# Patient Record
Sex: FEMALE | Race: WHITE | Employment: FULL TIME | URBAN - METROPOLITAN AREA
[De-identification: names, ages, dates, MRNs, and addresses within clinical notes are randomized per-mention and may not be internally consistent; named-entity substitution may affect disease eponyms.]

---

## 2018-04-06 DIAGNOSIS — G40.909 SEIZURE DISORDER (HCC): ICD-10-CM

## 2018-04-06 RX ORDER — PHENYTOIN SODIUM 100 MG/1
CAPSULE, EXTENDED RELEASE ORAL
Qty: 270 CAPSULE | Refills: 3 | Status: SHIPPED | OUTPATIENT
Start: 2018-04-06 | End: 2019-06-28 | Stop reason: SDUPTHER

## 2018-07-19 ENCOUNTER — OFFICE VISIT (OUTPATIENT)
Dept: NEUROLOGY | Age: 48
End: 2018-07-19
Payer: COMMERCIAL

## 2018-07-19 VITALS
HEIGHT: 65 IN | BODY MASS INDEX: 23.88 KG/M2 | SYSTOLIC BLOOD PRESSURE: 103 MMHG | WEIGHT: 143.3 LBS | DIASTOLIC BLOOD PRESSURE: 67 MMHG | HEART RATE: 90 BPM | OXYGEN SATURATION: 95 %

## 2018-07-19 DIAGNOSIS — G40.909 SEIZURE DISORDER (HCC): Primary | ICD-10-CM

## 2018-07-19 PROCEDURE — 99214 OFFICE O/P EST MOD 30 MIN: CPT | Performed by: CLINICAL NURSE SPECIALIST

## 2018-07-19 RX ORDER — ESTRADIOL 0.1 MG/D
FILM, EXTENDED RELEASE TRANSDERMAL
Refills: 0 | COMMUNITY
Start: 2018-07-12

## 2018-07-19 NOTE — PROGRESS NOTES
no carotid bruit, no JVD, supple  Lungs: clear to auscultation bilaterally  Heart: regular rate and rhythm, S1, S2 normal, no murmur, click, rub or gallop  Extremities:no cyanosis or edema  Pulses: 2+ and symmetric  Skin:  No rashes or lesions     Mental Status: alert and oriented to person place and time    Speech: clear  Language: normal    Cranial Nerves:  I: smell    II: visual acuity     II: visual fields Full   II: pupils MARIBEL   III,VII: ptosis    III,IV,VI: extraocular muscles  Full ROM   V: mastication Normal   V: facial light touch sensation  Normal   V,VII: corneal reflex  Present   VII: facial muscle function - upper     VII: facial muscle function - lower Normal   VIII: hearing Normal   IX: soft palate elevation  Normal   IX,X: gag reflex Present   XI: trapezius strength  5/5   XI: sternocleidomastoid strength 5/5   XI: neck extension strength  5/5   XII: tongue strength  Normal       Motor:  5/5 throughout  Normal bulk and tone    Sensory:  LT and PP normal  Vibration normal    Coordination:   FN, FFM and LALO normal  HS normal    Gait:  normal    DTR:   Right Brachioradialis reflex 2+  Left Brachioradialis reflex 2+  Right Biceps reflex 2+  Left Biceps reflex 2+  Right Quadriceps reflex 2+  Left Quadriceps reflex 2+  Right Achilles reflex 2+  Left Achilles reflex 2+      Laboratory/Radiology:     performed by PCP at [facility] over a year ago     I do not have these to personally review at this time     Assessment:     Simple partial seizures with secondary generalization   Previously table on Dilantin D.A.W. 300mg daily    Now switching to generic due to costs     No seizures reported    Plan:     Continue AED for now   Discussed at length about the possibility of switching if she is unable to tolerate generic formulation     Will obtain Dilantin level and CMP as well as CBC    RTO 1 year    Call with new difficulties    Roland Agrawal  3:26 PM  7/19/2018

## 2018-08-29 DIAGNOSIS — G40.909 SEIZURE DISORDER (HCC): ICD-10-CM

## 2018-08-29 LAB
ALBUMIN SERPL-MCNC: NORMAL G/DL
ALP BLD-CCNC: NORMAL U/L
ALT SERPL-CCNC: NORMAL U/L
ANION GAP SERPL CALCULATED.3IONS-SCNC: NORMAL MMOL/L
AST SERPL-CCNC: NORMAL U/L
BASOPHILS ABSOLUTE: NORMAL /ΜL
BASOPHILS RELATIVE PERCENT: NORMAL %
BILIRUB SERPL-MCNC: NORMAL MG/DL (ref 0.1–1.4)
BUN BLDV-MCNC: 15 MG/DL
CALCIUM SERPL-MCNC: NORMAL MG/DL
CHLORIDE BLD-SCNC: 104 MMOL/L
CO2: NORMAL MMOL/L
CREAT SERPL-MCNC: NORMAL MG/DL
EOSINOPHILS ABSOLUTE: NORMAL /ΜL
EOSINOPHILS RELATIVE PERCENT: NORMAL %
GFR CALCULATED: NORMAL
GLUCOSE BLD-MCNC: NORMAL MG/DL
HCT VFR BLD CALC: 41.4 % (ref 36–46)
HEMOGLOBIN: 14.1 G/DL (ref 12–16)
LYMPHOCYTES ABSOLUTE: NORMAL /ΜL
LYMPHOCYTES RELATIVE PERCENT: NORMAL %
MCH RBC QN AUTO: NORMAL PG
MCHC RBC AUTO-ENTMCNC: NORMAL G/DL
MCV RBC AUTO: NORMAL FL
MONOCYTES ABSOLUTE: NORMAL /ΜL
MONOCYTES RELATIVE PERCENT: NORMAL %
NEUTROPHILS ABSOLUTE: NORMAL /ΜL
NEUTROPHILS RELATIVE PERCENT: NORMAL %
PDW BLD-RTO: NORMAL %
PLATELET # BLD: NORMAL K/ΜL
PMV BLD AUTO: NORMAL FL
POTASSIUM SERPL-SCNC: 4.1 MMOL/L
RBC # BLD: 4.3 10^6/ΜL
SODIUM BLD-SCNC: 136 MMOL/L
TOTAL PROTEIN: NORMAL
WBC # BLD: 4.4 10^3/ML

## 2019-03-25 ENCOUNTER — HOSPITAL ENCOUNTER (OUTPATIENT)
Dept: GENERAL RADIOLOGY | Age: 49
Discharge: HOME | End: 2019-03-27
Payer: COMMERCIAL

## 2019-03-25 DIAGNOSIS — Z12.31 ENCOUNTER FOR SCREENING MAMMOGRAM FOR MALIGNANT NEOPLASM OF BREAST: ICD-10-CM

## 2019-03-25 PROCEDURE — 77063 BREAST TOMOSYNTHESIS BI: CPT

## 2019-06-27 DIAGNOSIS — G40.909 SEIZURE DISORDER (HCC): ICD-10-CM

## 2019-06-28 RX ORDER — PHENYTOIN SODIUM 100 MG/1
CAPSULE, EXTENDED RELEASE ORAL
Qty: 270 CAPSULE | Refills: 3 | Status: SHIPPED
Start: 2019-06-28 | End: 2020-06-16

## 2019-07-30 ENCOUNTER — OFFICE VISIT (OUTPATIENT)
Dept: NEUROLOGY | Age: 49
End: 2019-07-30
Payer: COMMERCIAL

## 2019-07-30 VITALS
WEIGHT: 140 LBS | BODY MASS INDEX: 23.32 KG/M2 | RESPIRATION RATE: 14 BRPM | HEIGHT: 65 IN | SYSTOLIC BLOOD PRESSURE: 111 MMHG | OXYGEN SATURATION: 97 % | DIASTOLIC BLOOD PRESSURE: 74 MMHG | HEART RATE: 81 BPM

## 2019-07-30 DIAGNOSIS — G40.909 SEIZURE DISORDER (HCC): Primary | ICD-10-CM

## 2019-07-30 PROCEDURE — 99214 OFFICE O/P EST MOD 30 MIN: CPT | Performed by: CLINICAL NURSE SPECIALIST

## 2019-07-30 NOTE — PROGRESS NOTES
Krystin Wei is a 57 y.o. right handed female     Long history of epilepsy   Tried numerous AEDs in the past   Unable to tolerate Keppra (marked alopecia)    Tried generic phenytoin without success in past -- increased auras    Has been seizure free for many years - maintained on Dilantin CARMINA -- now becoming quite expensive     She requested to retry generic and this was recently ordered   Hesitant to try alternatives because of issues with Keppra     No auras reported in years   No seizures reported in years     Not missing any doses of medications     No tongue biting   No incontinence  No seizure activity     Medically, she denies any difficulties - no new medications     Labs reviewed since last appt     No chest pain or palpitations  No SOB  No vertigo, lightheadedness or loss of consciousness  No falls, tripping or stumbling  No incontinence of bowels or bladder  No itching or bruising appreciated  No numbness, tingling or focal arm/leg weakness    ROS otherwise negative     Prior to Visit Medications    Medication Sig Taking?  Authorizing Provider   phenytoin (DILANTIN) 100 MG ER capsule Take 3 capsules daily - CARMINA only Yes PK Ho - CNS   estradiol (VIVELLE) 0.1 MG/24HR UNW AND MIGUEL 1 PA TO THE SKIN  TWICE A WEEK Yes Historical Provider, MD   PREMARIN 0.9 MG tablet Take 0.9 mg by mouth daily  Yes Historical Provider, MD   venlafaxine (EFFEXOR-XR) 75 MG XR capsule Take 75 mg by mouth daily  Yes Historical Provider, MD   RESTASIS 0.05 % EMUL  Yes Historical Provider, MD     Allergies as of 07/30/2019    (No Known Allergies)     Objective:     /74 (Site: Left Upper Arm, Position: Sitting, Cuff Size: Medium Adult)   Pulse 81   Resp 14   Ht 5' 5\" (1.651 m)   Wt 140 lb (63.5 kg)   SpO2 97%   BMI 23.30 kg/m²   Afebrile      General appearance: alert, appears stated age and cooperative  Head: Normocephalic, without obvious abnormality, atraumatic  Neck: no adenopathy, no carotid bruit,

## 2020-02-20 ENCOUNTER — TELEPHONE (OUTPATIENT)
Dept: NEUROLOGY | Age: 50
End: 2020-02-20

## 2020-03-02 ENCOUNTER — OFFICE VISIT (OUTPATIENT)
Dept: PRIMARY CARE CLINIC | Age: 50
End: 2020-03-02
Payer: COMMERCIAL

## 2020-03-02 ENCOUNTER — NURSE TRIAGE (OUTPATIENT)
Dept: OTHER | Facility: CLINIC | Age: 50
End: 2020-03-02

## 2020-03-02 VITALS
BODY MASS INDEX: 23.46 KG/M2 | RESPIRATION RATE: 14 BRPM | TEMPERATURE: 98.5 F | WEIGHT: 141 LBS | OXYGEN SATURATION: 98 % | HEART RATE: 89 BPM | DIASTOLIC BLOOD PRESSURE: 78 MMHG | SYSTOLIC BLOOD PRESSURE: 104 MMHG

## 2020-03-02 PROCEDURE — 99214 OFFICE O/P EST MOD 30 MIN: CPT | Performed by: FAMILY MEDICINE

## 2020-03-02 PROCEDURE — 93000 ELECTROCARDIOGRAM COMPLETE: CPT | Performed by: FAMILY MEDICINE

## 2020-03-02 ASSESSMENT — PATIENT HEALTH QUESTIONNAIRE - PHQ9
SUM OF ALL RESPONSES TO PHQ QUESTIONS 1-9: 0
2. FEELING DOWN, DEPRESSED OR HOPELESS: 0
SUM OF ALL RESPONSES TO PHQ9 QUESTIONS 1 & 2: 0
1. LITTLE INTEREST OR PLEASURE IN DOING THINGS: 0
SUM OF ALL RESPONSES TO PHQ QUESTIONS 1-9: 0

## 2020-03-02 NOTE — PROGRESS NOTES
3/2/20  Luly Lopez : 1970 Sex: female  Age: 54 y.o. Chief Complaint   Patient presents with    Palpitations     Pt states for the past 2-3 months she's had heart palpitations that have gotten progressively worse. No new meds, no dietary changes and drinks a soda containing caffeine and tea that contains caffeine everyday. HPI  HPI:    Patient presents today complaining of palpitations. She states she is noticed them periodically over the past 2 to 3 months but she always attributed to cough and cold meds etc.  Admits to 2 or 3 caffeinated beverages a day sometimes more but nothing is changed. Last night she noticed a couple hours of palpitations, eventually subsided on its own. There was no associated symptoms including chest pain pressure shortness of breath diaphoresis nausea syncope dizziness near syncope abdominal pain vomiting or otherwise. She is here with her  today. She denies any change in anxiety, denies nicotine or otherwise. No other complaints or concerns. Last seen . Called in today and came in as a \"same day\". Vital signs are stable. Risks of estradiol reviewed. Defers chest imaging at this time. Current ROS as below and asymptomatic today  Review of Systems  ROS:  Const: Denies chills, fever, malaise and sweats. Eyes: Denies discharge, pain, redness and visual disturbance. ENMT: Denies earaches, other ear symptoms. Denies nasal or sinus symptoms other than stated  above. Denies mouth and tongue lesions and sore throat. CV: Denies chest discomfort, pain; diaphoresis, dizziness, edema, lightheadedness, orthopnea,  palpitations, syncope and near syncopal episode or any exertional symptoms  Resp: Denies cough, hemoptysis, pleuritic pain, SOB, sputum production and wheezing. GI: Denies abdominal pain, change in bowel habits, hematochezia, melena, nausea and vomiting.   : Denies urinary symptoms including dysuria , urgency, frequency or ovarian resection secondary endometriosis    Reviewed, no changes. FH:    Father:    . (Hx)    Mother:    . (Hx)    Dad - doesn't know    Mom - living, HTN    Not close with brother. Maternal grandmother ? type of cancer age 66's, had CABG 52's or 63's    Maternal grandfather   MI age 42's        Paternal grandfather ?type of cancer 66's    Reviewed, no changes. SH:    . (Marital)     -     3 step kids    Personal Habits: Cigarette Use: Nonsmoker. Alcohol: Denies alcohol use. Vitals:    20 1051   BP: 104/78   Pulse: 89   Resp: 14   Temp: 98.5 °F (36.9 °C)   TempSrc: Temporal   SpO2: 98%   Weight: 141 lb (64 kg)      Wt Readings from Last 3 Encounters:   20 141 lb (64 kg)   19 140 lb (63.5 kg)   18 143 lb 4.8 oz (65 kg)        Physical Exam  Exam:  Const: Appears comfortable. No signs of acute distress present. Head/Face: Atraumatic on inspection. Eyes: EOMI in both eyes. No discharge from the eyes. PERRL. Sclerae clear. ENMT: Auditory canals normal. Tympanic membranes: intact and translucent. External nose WNL. Nasal mucosa is clear. Oropharynx: No erythema or exudate. Posterior pharynx is normal.  Neck: Supple. Palpation reveals no adenopathy. No masses appreciated. No JVD. Carotids: no  bruits. Resp: Respirations are unlabored. Clear to auscultation. No rales, rhonchi or wheezes appreciated  over the lungs bilaterally. CV: Rate is regular. Rhythm is regular. No gallop or rubs. No heart murmur appreciated. Extremities: No clubbing, cyanosis, or edema. No calf inflammation or tenderness. Abdomen: Bowel sounds are normoactive. Abdomen is soft, nontender, and nondistended. No  abdominal masses. No palpable hepatosplenomegaly. Lymph: No palpable or visible regional lymphadenopathy. Musculoskeletal: no acute joint inflammation. Skin: Dry and warm with no rash. Skin normal to inspection and palpation overall.   Neuro: Alert and oriented. Affect: appropriate. Upper Extremities: 5/5 bilaterally. Lower Extremities:  5/5 bilaterally. Sensation intact to light touch. Reflexes: DTR's are symmetric and 2+ bilaterally. .  Cranial Nerves: Cranial nerves grossly intact. Assessment and Plan:   Diagnosis Orders   1. Heart palpitations  EKG 12 Lead    CBC Auto Differential    Comprehensive Metabolic Panel    TSH without Reflex    T4, Free    Lipid Panel    Magnesium    Urinalysis    Ambulatory referral to Cardiology   2. Health maintenance examination  Lipid Panel    Urinalysis       No problem-specific Assessment & Plan notes found for this encounter. No flowsheet data found. Plan as above. Counseled extensively and differential diagnoses relevant to above were reviewed, including serious etiologies. Side effects and interactions of medications were reviewed. My concerns reviewed. She wants to simply look at this on a nonurgent outpatient basis. Refuses ER hospitalization now. Refer to cardiology ASAP. Discussed Holter/event monitor, stress test echo. Avoid stimulants. Check blood work. As long as remains asymptomatic follow-up 1 week sooner as needed. As long as symptoms steadily improve/resolve, and medical conditions follow the expected course, FU as below, sooner PRN. No follow-ups on file. Signs and symptoms to watch for discussed, serious signs and symptoms reviewed. ER if any. Grace Rhoades MD    Patients are advised to check with insurance company to ensure coverage and to fully understand benefits and cost prior to any testing. This note was created with the assistance of voice recognition software. Document was reviewed however may contain grammatical errors.

## 2020-03-03 LAB
BASOPHILS ABSOLUTE: 42 /ΜL
BASOPHILS RELATIVE PERCENT: 0.9 %
EOSINOPHILS ABSOLUTE: 240 /ΜL
EOSINOPHILS RELATIVE PERCENT: 5.1 %
HCT VFR BLD CALC: 41.1 % (ref 36–46)
HEMOGLOBIN: 14.4 G/DL (ref 12–16)
LYMPHOCYTES ABSOLUTE: 1368 /ΜL
LYMPHOCYTES RELATIVE PERCENT: 29.1 %
MCH RBC QN AUTO: 32.4 PG
MCHC RBC AUTO-ENTMCNC: 35 G/DL
MCV RBC AUTO: 92.4 FL
MONOCYTES ABSOLUTE: 362 /ΜL
MONOCYTES RELATIVE PERCENT: 7.7 %
NEUTROPHILS ABSOLUTE: 2688 /ΜL
NEUTROPHILS RELATIVE PERCENT: 57.2 %
PDW BLD-RTO: 11.9 %
PLATELET # BLD: 241 K/ΜL
PMV BLD AUTO: 9.8 FL
RBC # BLD: 4.45 10^6/ΜL
WBC # BLD: 4.7 10^3/ML

## 2020-03-04 LAB
ALBUMIN SERPL-MCNC: 4.5 G/DL
ALP BLD-CCNC: 100 U/L
ALT SERPL-CCNC: 11 U/L
ANION GAP SERPL CALCULATED.3IONS-SCNC: NORMAL MMOL/L
AST SERPL-CCNC: 15 U/L
BILIRUB SERPL-MCNC: 0.5 MG/DL (ref 0.1–1.4)
BILIRUBIN, URINE: NEGATIVE
BLOOD, URINE: NEGATIVE
BUN BLDV-MCNC: 10 MG/DL
CALCIUM SERPL-MCNC: 9 MG/DL
CHLORIDE BLD-SCNC: 102 MMOL/L
CHOLESTEROL, TOTAL: 202 MG/DL
CHOLESTEROL/HDL RATIO: 2.2
CLARITY: CLEAR
CO2: 31 MMOL/L
COLOR: YELLOW
CREAT SERPL-MCNC: 0.68 MG/DL
GFR CALCULATED: 104
GLUCOSE BLD-MCNC: 88 MG/DL
GLUCOSE URINE: NORMAL
HDLC SERPL-MCNC: 93 MG/DL (ref 35–70)
KETONES, URINE: NEGATIVE
LDL CHOLESTEROL CALCULATED: 88 MG/DL (ref 0–160)
LEUKOCYTE ESTERASE, URINE: NEGATIVE
MAGNESIUM: 1.9 MG/DL
NITRITE, URINE: NEGATIVE
PH UA: 6 (ref 4.5–8)
POTASSIUM SERPL-SCNC: 4 MMOL/L
PROTEIN UA: NEGATIVE
SODIUM BLD-SCNC: 139 MMOL/L
SPECIFIC GRAVITY, URINE: 1.01
T4 FREE: 0.9
TOTAL PROTEIN: 6.6
TRIGL SERPL-MCNC: 111 MG/DL
TSH SERPL DL<=0.05 MIU/L-ACNC: 2.58 UIU/ML
UROBILINOGEN, URINE: NORMAL
VLDLC SERPL CALC-MCNC: ABNORMAL MG/DL

## 2020-03-05 ENCOUNTER — OFFICE VISIT (OUTPATIENT)
Dept: CARDIOLOGY CLINIC | Age: 50
End: 2020-03-05
Payer: COMMERCIAL

## 2020-03-05 VITALS
HEART RATE: 67 BPM | RESPIRATION RATE: 16 BRPM | SYSTOLIC BLOOD PRESSURE: 118 MMHG | WEIGHT: 145 LBS | DIASTOLIC BLOOD PRESSURE: 84 MMHG | HEIGHT: 65 IN | BODY MASS INDEX: 24.16 KG/M2

## 2020-03-05 PROBLEM — R00.2 PALPITATIONS: Status: ACTIVE | Noted: 2020-03-05

## 2020-03-05 PROCEDURE — 93000 ELECTROCARDIOGRAM COMPLETE: CPT | Performed by: INTERNAL MEDICINE

## 2020-03-05 PROCEDURE — 99244 OFF/OP CNSLTJ NEW/EST MOD 40: CPT | Performed by: INTERNAL MEDICINE

## 2020-03-05 NOTE — PROGRESS NOTES
Fear of current or ex partner: Not on file     Emotionally abused: Not on file     Physically abused: Not on file     Forced sexual activity: Not on file   Other Topics Concern    Not on file   Social History Narrative    PMH:    Health Maintenance:    Mammogram - (2016)    Mammogram Screening - (2016)    Tetanus Immunization - (2016)    Medical Problems:    Seizure Disorder - since age 25. Dr Darnell Rush    GERD - Had EGD/C-scope by Dr Rober Ernst and Ph probe     IBS    endometriosis - surgery to remove ovaries 8/15. wrapped around colon as well        Surgical Hx:    [place] -     Partial Hysterectomy - both ovaries remain. [facility])    Breast Reduction    EGD - , 2012 showing gastritis/GERD and [facility]    PH probe 2012    Colonoscopy -  [facility]; rudundant colon    laparoscopic - ovarian resection secondary endometriosis    Reviewed, no changes. FH:    Father:    . (Hx)    Mother:    . (Hx)    Dad - doesn't know    Mom - living, HTN    Not close with brother. Maternal grandmother ? type of cancer age 66's, had CABG 52's or 63's    Maternal grandfather   MI age 42's        Paternal grandfather ?type of cancer 66's    Reviewed, no changes. SH:    . (Marital)     -     3 step kids    Personal Habits: Cigarette Use: Nonsmoker. Alcohol: Denies alcohol use. History reviewed. No pertinent family history. SUBJECTIVE: Luly Suárez presents to the office today for consult - dr. Ghassan Licea - for evaluation of palpitations. She complains of palpitations and denies   chest pain, chest pressure/discomfort, claudication, dyspnea, exertional chest pressure/discomfort, fatigue, lower extremity edema, near-syncope, orthopnea, paroxysmal nocturnal dyspnea, syncope and tachypnea. No cardiac hx. Works as , does AODLs with no issue and exercises.   For a month she has noted skipped beats only at night, usually mild, but one evening could not fall asleep for hours. No hx sleep apnea, substance abuse, new meds, etc.       Review of Systems:   Heart: as above   Lungs: as above   Eyes: denies changes in vision or discharge. Ears: denies changes in hearing or pain. Nose: denies epistaxis or masses   Throat: denies sore throat or trouble swallowing. Neuro: denies numbness, tingling, tremors. Skin: denies rashes or itching. : denies hematuria, dysuria   GI: denies vomiting, diarrhea   Psych: denies mood changed, anxiety, depression. all others negative. Physical Exam   /84   Pulse 67   Resp 16   Ht 5' 5\" (1.651 m)   Wt 145 lb (65.8 kg)   BMI 24.13 kg/m²   Constitutional: Oriented to person, place, and time. Well-developed and well-nourished. No distress. Head: Normocephalic and atraumatic. Eyes: EOM are normal. Pupils are equal, round, and reactive to light. Neck: Normal range of motion. Neck supple. No hepatojugular reflux and no JVD present. Carotid bruit is not present. No tracheal deviation present. No thyromegaly present. Cardiovascular: Normal rate, regular rhythm, normal heart sounds and intact distal pulses. Exam reveals no gallop and no friction rub. No murmur heard. Pulmonary/Chest: Effort normal and breath sounds normal. No respiratory distress. No wheezes. No rales. No tenderness. Abdominal: Soft. Bowel sounds are normal. No distension and no mass. No tenderness. No rebound and no guarding. Musculoskeletal: Normal range of motion. No edema and no tenderness. Neurological: Alert and oriented to person, place, and time. Skin: Skin is warm and dry. No rash noted. Not diaphoretic. No erythema. Psychiatric: Normal mood and affect. Behavior is normal.     EKG:  normal EKG, normal sinus rhythm.     ASSESSMENT AND PLAN:  Patient Active Problem List   Diagnosis    Seizure disorder ([address]    Palpitations     Benign palpitations by description with normal ekg and exam  Will give 30 day monitor      Loretta Haq M.D  [facility]

## 2020-03-17 ENCOUNTER — OFFICE VISIT (OUTPATIENT)
Dept: PRIMARY CARE CLINIC | Age: 50
End: 2020-03-17
Payer: COMMERCIAL

## 2020-03-17 VITALS
SYSTOLIC BLOOD PRESSURE: 124 MMHG | OXYGEN SATURATION: 98 % | HEART RATE: 95 BPM | DIASTOLIC BLOOD PRESSURE: 60 MMHG | WEIGHT: 145 LBS | BODY MASS INDEX: 24.13 KG/M2

## 2020-03-17 PROBLEM — Z00.00 HEALTH MAINTENANCE EXAMINATION: Status: ACTIVE | Noted: 2020-03-17

## 2020-03-17 PROCEDURE — 99213 OFFICE O/P EST LOW 20 MIN: CPT | Performed by: FAMILY MEDICINE

## 2020-03-17 NOTE — PROGRESS NOTES
comfortable. No signs of acute distress present. Head/Face: Atraumatic on inspection. Eyes: EOMI in both eyes. No discharge from the eyes. PERRL. Sclerae clear. ENMT: Auditory canals normal. Tympanic membranes: intact and translucent. External nose WNL. Nasal mucosa is clear. Oropharynx: No erythema or exudate. Posterior pharynx is normal.  Neck: Supple. Palpation reveals no adenopathy. No masses appreciated. No JVD. Carotids: no  bruits. Resp: Respirations are unlabored. Clear to auscultation. No rales, rhonchi or wheezes appreciated  over the lungs bilaterally. CV: Rate is regular. Rhythm is regular. No gallop or rubs. No heart murmur appreciated. Extremities: No clubbing, cyanosis, or edema. No calf inflammation or tenderness. Abdomen: Bowel sounds are normoactive. Abdomen is soft, nontender, and nondistended. No  abdominal masses. No palpable hepatosplenomegaly. Lymph: No palpable or visible regional lymphadenopathy. Musculoskeletal: no acute joint inflammation. Skin: Dry and warm with no rash. Skin normal to inspection and palpation overall. Neuro: Alert and oriented. Affect: appropriate. Upper Extremities: 5/5 bilaterally. Lower Extremities:  5/5 bilaterally. Sensation intact to light touch. Reflexes: DTR's are symmetric and 2+ bilaterally. .  Cranial Nerves: Cranial nerves grossly intact. Assessment and Plan:   Diagnosis Orders   1. Heart palpitations     2. Health maintenance examination     3. Palpitations         Palpitations  Counseled extensively. Differential reviewed, including serious etiologies. Saw Dr. Carmela Ruelas. Blood work-up negative. Wearing 30-day monitor. Asymptomatic. Declines other evaluation treatment now. Continue to limit caffeine. Anxiety reduction avoid decongestants and other stimulants. Health maintenance examination  Encourage yearly physicals. Before her March 2 visit, she was last seen 2/2018. She is up-to-date on Tdap.   Counseled on appropriate eye and dental exams.  She states she follows with gynecologist and gets mammograms through them. Colorectal cancer screening reviewed. States she is had colonoscopies in the past.  Declines otherwise now. No flowsheet data found. Plan as above. Counseled extensively and differential diagnoses relevant to above were reviewed, including serious etiologies. Side effects and interactions of medications were reviewed. Counseled. Did encourage her to follow-up soon for a full physical sooner as needed. Declines other evaluation treatment of the previous symptoms now. As long as symptoms steadily improve/resolve, and medical conditions follow the expected course, FU as below, sooner PRN. No follow-ups on file. Signs and symptoms to watch for discussed, serious signs and symptoms reviewed. ER if any. Yue Branch MD    Patients are advised to check with insurance company to ensure coverage and to fully understand benefits and cost prior to any testing. This note was created with the assistance of voice recognition software. Document was reviewed however may contain grammatical errors.

## 2020-03-17 NOTE — ASSESSMENT & PLAN NOTE
Encourage yearly physicals. Before her March 2 visit, she was last seen 2/2018. She is up-to-date on Tdap. Counseled on appropriate eye and dental exams. She states she follows with gynecologist and gets mammograms through them. Colorectal cancer screening reviewed. States she is had colonoscopies in the past.  Declines otherwise now.

## 2020-03-17 NOTE — ASSESSMENT & PLAN NOTE
Counseled extensively. Differential reviewed, including serious etiologies. Saw Dr. Crispin Kennedy. Blood work-up negative. Wearing 30-day monitor. Asymptomatic. Declines other evaluation treatment now. Continue to limit caffeine. Anxiety reduction avoid decongestants and other stimulants.

## 2020-04-14 ENCOUNTER — TELEPHONE (OUTPATIENT)
Dept: CARDIOLOGY CLINIC | Age: 50
End: 2020-04-14

## 2020-04-14 NOTE — TELEPHONE ENCOUNTER
----- Message from Zarina Taylor MD sent at 4/14/2020  2:40 PM EDT -----  Normal heart beat even when she had symptoms  Ov prn

## 2020-04-16 PROBLEM — Z00.00 HEALTH MAINTENANCE EXAMINATION: Status: RESOLVED | Noted: 2020-03-17 | Resolved: 2020-04-16

## 2020-06-16 RX ORDER — PHENYTOIN SODIUM 100 MG/1
CAPSULE, EXTENDED RELEASE ORAL
Qty: 270 CAPSULE | Refills: 3 | Status: SHIPPED
Start: 2020-06-16 | End: 2021-07-14

## 2020-08-13 NOTE — PROGRESS NOTES
Julia Cruz is a 54 y.o. right handed female     Long history of epilepsy   Tried numerous AEDs in the past   Unable to tolerate Keppra (marked alopecia)    Tried generic phenytoin without success in past -- increased auras    Has been seizure free for many years - maintained on Dilantin CARMINA -- but it was becoming quite expensive     Switched to generic hesitantly and has done well     Hesitant to try alternatives because of issues with Keppra   No auras reported in years   No seizures reported in years     Not missing any doses of medications     No tongue biting   No incontinence  No seizure activity     Medically, she denies any difficulties - no new medications     Labs ordered today     No chest pain or palpitations  No SOB  No vertigo, lightheadedness or loss of consciousness  No falls, tripping or stumbling  No incontinence of bowels or bladder  No itching or bruising appreciated  No numbness, tingling or focal arm/leg weakness    ROS otherwise negative     Prior to Visit Medications    Medication Sig Taking?  Authorizing Provider   phenytoin (DILANTIN) 100 MG ER capsule TAKE 3 CAPSULES BY MOUTH DAILY Yes PK Conrad - CNS   estradiol (Miryam Erm) 0.1 MG/24HR UNW AND MIGUEL 1 PA TO THE SKIN  TWICE A WEEK Yes Historical Provider, MD   PREMARIN 0.9 MG tablet Take 0.9 mg by mouth daily  Yes Historical Provider, MD   venlafaxine (EFFEXOR-XR) 75 MG XR capsule Take 75 mg by mouth daily  Yes Historical Provider, MD   RESTASIS 0.05 % EMUL  Yes Historical Provider, MD     Allergies as of 08/14/2020    (No Known Allergies)     Objective:     /73 (Site: Right Upper Arm, Position: Sitting, Cuff Size: Medium Adult)   Pulse 82   Temp 98 °F (36.7 °C)   Resp 20   Ht 5' 5\" (1.651 m)   Wt 140 lb (63.5 kg)   SpO2 98%   BMI 23.30 kg/m²   Afebrile      General appearance: alert, appears stated age and cooperative  Head: Normocephalic, without obvious abnormality, atraumatic  Extremities:no cyanosis or edema  Pulses: 2+ and symmetric  Skin:  No rashes or lesions     Mental Status: alert and oriented to person place and time    Speech: clear  Language: normal    Cranial Nerves:  I: smell    II: visual acuity     II: visual fields Full   II: pupils MARIBEL   III,VII: ptosis    III,IV,VI: extraocular muscles  Full ROM   V: mastication Normal   V: facial light touch sensation  Normal   V,VII: corneal reflex  Present   VII: facial muscle function - upper     VII: facial muscle function - lower Normal   VIII: hearing Normal   IX: soft palate elevation  Normal   IX,X: gag reflex Present   XI: trapezius strength  5/5   XI: sternocleidomastoid strength 5/5   XI: neck extension strength  5/5   XII: tongue strength  Normal     Motor:  5/5 throughout  Normal bulk and tone    Sensory:  LT normal  Vibration normal    Coordination:   FN, FFM and LALO normal  HS normal    Gait:  normal    DTR:   Right Brachioradialis reflex 2+  Left Brachioradialis reflex 2+  Right Biceps reflex 2+  Left Biceps reflex 2+  Right Quadriceps reflex 2+  Left Quadriceps reflex 2+  Right Achilles reflex 2+  Left Achilles reflex 2+    Laboratory/Radiology:     CBC and CMP unrevealing    Dilantin 16.1     Labs personally reviewed under media     Assessment:     Simple partial seizures with secondary generalization   Stable on phenytoin 300mg daily     No seizures reported    Plan:     Continue AED for now   Discussed at length about the possibility of switching if she is unable to tolerate generic formulation     Labs ordered     RTO 1 year    Call with new difficulties    Love Jacobsen  9:41 AM  8/14/2020

## 2020-08-14 ENCOUNTER — OFFICE VISIT (OUTPATIENT)
Dept: NEUROLOGY | Age: 50
End: 2020-08-14
Payer: COMMERCIAL

## 2020-08-14 VITALS
HEIGHT: 65 IN | BODY MASS INDEX: 23.32 KG/M2 | HEART RATE: 82 BPM | SYSTOLIC BLOOD PRESSURE: 106 MMHG | OXYGEN SATURATION: 98 % | TEMPERATURE: 98 F | DIASTOLIC BLOOD PRESSURE: 73 MMHG | RESPIRATION RATE: 20 BRPM | WEIGHT: 140 LBS

## 2020-08-14 PROCEDURE — 99214 OFFICE O/P EST MOD 30 MIN: CPT | Performed by: CLINICAL NURSE SPECIALIST

## 2020-09-25 ENCOUNTER — TELEPHONE (OUTPATIENT)
Dept: NEUROLOGY | Age: 50
End: 2020-09-25

## 2020-09-25 NOTE — TELEPHONE ENCOUNTER
LM regarding labs from 8/14 w/Suhail Montes, DNP  Electronically signed by Joe Garcia on 9/25/20 at 2:32 PM EDT

## 2021-02-02 ENCOUNTER — TELEPHONE (OUTPATIENT)
Dept: NEUROLOGY | Age: 51
End: 2021-02-02

## 2021-02-02 NOTE — TELEPHONE ENCOUNTER
2nd attempt to reach patient regarding labs from Aug. 2020  Electronically signed by Irineo Lacy on 2/2/21 at 11:24 AM EST

## 2021-06-15 ENCOUNTER — OFFICE VISIT (OUTPATIENT)
Dept: PRIMARY CARE CLINIC | Age: 51
End: 2021-06-15
Payer: COMMERCIAL

## 2021-06-15 VITALS
WEIGHT: 140 LBS | RESPIRATION RATE: 16 BRPM | TEMPERATURE: 97.5 F | HEIGHT: 65 IN | OXYGEN SATURATION: 98 % | SYSTOLIC BLOOD PRESSURE: 104 MMHG | DIASTOLIC BLOOD PRESSURE: 72 MMHG | BODY MASS INDEX: 23.32 KG/M2 | HEART RATE: 92 BPM

## 2021-06-15 DIAGNOSIS — N39.0 URINARY TRACT INFECTION WITHOUT HEMATURIA, SITE UNSPECIFIED: ICD-10-CM

## 2021-06-15 DIAGNOSIS — Z00.00 HEALTH MAINTENANCE EXAMINATION: ICD-10-CM

## 2021-06-15 DIAGNOSIS — N39.0 URINARY TRACT INFECTION WITHOUT HEMATURIA, SITE UNSPECIFIED: Primary | ICD-10-CM

## 2021-06-15 LAB
BILIRUBIN, POC: NEGATIVE
BLOOD URINE, POC: NEGATIVE
CLARITY, POC: CLEAR
COLOR, POC: YELLOW
GLUCOSE URINE, POC: NEGATIVE
KETONES, POC: NEGATIVE
LEUKOCYTE EST, POC: NEGATIVE
NITRITE, POC: NEGATIVE
PH, POC: 6.2
PROTEIN, POC: NEGATIVE
SPECIFIC GRAVITY, POC: 1.02
UROBILINOGEN, POC: 0.2

## 2021-06-15 PROCEDURE — 81003 URINALYSIS AUTO W/O SCOPE: CPT | Performed by: FAMILY MEDICINE

## 2021-06-15 PROCEDURE — 99214 OFFICE O/P EST MOD 30 MIN: CPT | Performed by: FAMILY MEDICINE

## 2021-06-15 RX ORDER — SULFAMETHOXAZOLE AND TRIMETHOPRIM 800; 160 MG/1; MG/1
TABLET ORAL
COMMUNITY
Start: 2021-06-13 | End: 2021-12-06

## 2021-06-15 RX ORDER — FLUCONAZOLE 150 MG/1
TABLET ORAL
Qty: 2 TABLET | Refills: 0 | Status: SHIPPED
Start: 2021-06-15 | End: 2021-12-06

## 2021-06-15 ASSESSMENT — PATIENT HEALTH QUESTIONNAIRE - PHQ9
1. LITTLE INTEREST OR PLEASURE IN DOING THINGS: 0
2. FEELING DOWN, DEPRESSED OR HOPELESS: 0
SUM OF ALL RESPONSES TO PHQ9 QUESTIONS 1 & 2: 0
SUM OF ALL RESPONSES TO PHQ QUESTIONS 1-9: 0

## 2021-06-15 NOTE — PROGRESS NOTES
6/15/21  Luly Lopez : 1970 Sex: female  Age: 52 y.o. Chief Complaint   Patient presents with    Urinary Tract Infection     pressure and urgency- was in urgent care-2 different abx not feeling better       HPI  HPI:    Presents today with urinary symptoms, was in urgent care last week. States she gets 2 UTIs a year. Symptoms typically resolve after 1 day of antibiotic. In the urgent care she was initially prescribed Macrobid. Had persistent symptoms so returned, antibiotic was switched to Bactrim. X5 days. She started this a day and a half ago. Symptoms do feel improved now but was still symptomatic this morning. Mostly urgency and frequency. No pain. No abdominal pain back pain fever chills or malaise. She states her urine was previously positive. History of full hysterectomy. Denies vaginal discharge irritation. Up-to-date on GYN exams, saw couple months ago. She is quite concerned over the symptoms therefore multiple options were reviewed but at this point wants to proceed as below. ROS:  As above  ROS:  Const: Denies chills, fever, malaise and sweats. Eyes: Denies discharge, pain, redness and visual disturbance. ENMT: Denies earaches, other ear symptoms. Denies nasal or sinus symptoms other than stated  above. Denies mouth and tongue lesions and sore throat. CV: Denies chest discomfort, pain; diaphoresis, dizziness, edema, lightheadedness, orthopnea,  palpitations, syncope and near syncopal episode or any exertional symptoms  Resp: Denies cough, hemoptysis, pleuritic pain, SOB, sputum production and wheezing. GI: Denies abdominal pain, change in bowel habits, hematochezia, melena, nausea and vomiting. Musculo: Denies musculoskeletal symptoms. Skin: Denies bruising and rash.   Neuro: Denies headache, numbness, stiff neck, tingling and focal weakness slurred speech or facial  droop  Hema/Lymph: Denies bleeding/bruising tendency and enlarged lymph nodes      Current Outpatient Medications:     fluconazole (DIFLUCAN) 150 MG tablet, Take 1 po qd x 1. May repeat  x 1 in 7 days if needed, Disp: 2 tablet, Rfl: 0    phenytoin (DILANTIN) 100 MG ER capsule, TAKE 3 CAPSULES BY MOUTH DAILY, Disp: 270 capsule, Rfl: 3    estradiol (VIVELLE) 0.1 MG/24HR, UNW AND MIGUEL 1 PA TO THE SKIN  TWICE A WEEK, Disp: , Rfl: 0    PREMARIN 0.9 MG tablet, Take 0.9 mg by mouth daily , Disp: , Rfl:     venlafaxine (EFFEXOR-XR) 75 MG XR capsule, Take 75 mg by mouth daily , Disp: , Rfl:     RESTASIS 0.05 % EMUL,   , Disp: , Rfl:     sulfamethoxazole-trimethoprim (BACTRIM DS;SEPTRA DS) 800-160 MG per tablet, TAKE 1 TABLET BY MOUTH TWICE DAILY FOR 5 DAYS, Disp: , Rfl:   No Known Allergies    Past Medical History:   Diagnosis Date    Endometriosis     IBS (irritable bowel syndrome)     Ovarian cyst     Seizure ([facility] [address]      Past Surgical History:   Procedure Laterality Date    BREAST SURGERY      CHOLECYSTECTOMY      HYSTERECTOMY       No family history on file. Social History     Tobacco Use    Smoking status: Never Smoker    Smokeless tobacco: Never Used   Substance Use Topics    Alcohol use: No     Alcohol/week: 0.0 standard drinks    Drug use: No      Social History     Social History Narrative    PMH:    Health Maintenance:    Mammogram - (12/19/2016)    Mammogram Screening - (12/19/2016)    Tetanus Immunization - (5/4/2016)    Medical Problems:    Seizure Disorder - since age 25. Dr Alexandru Castellon    GERD - Had EGD/C-scope by Dr Eric Alberto and Ph probe 2011    IBS    endometriosis - surgery to remove ovaries 8/15. wrapped around colon as well        Surgical Hx:    Dalai - 2011    Partial Hysterectomy - both ovaries remain. [facility])    Breast Reduction    EGD - 2010, feb 2012 showing gastritis/GERD and [facility]    PH probe April 2012    Colonoscopy - 2010 [facility]; rudundant colon    laparoscopic - ovarian resection secondary endometriosis    Reviewed, no changes. FH:    Father:    . (Hx)    Mother:    . (Hx)    Dad - doesn't know    Mom - living, HTN    Not close with brother. Maternal grandmother ? type of cancer age 66's, had CABG 52's or 63's    Maternal grandfather   MI age 42's        Paternal grandfather ?type of cancer 66's    Reviewed, no changes. SH:    . (Marital)     -     3 step kids    Personal Habits: Cigarette Use: Nonsmoker. Alcohol: Denies alcohol use. Vitals:    06/15/21 1417   BP: 104/72   Pulse: 92   Resp: 16   Temp: 97.5 °F (36.4 °C)   TempSrc: Temporal   SpO2: 98%   Weight: 140 lb (63.5 kg)   Height: 5' 5\" (1.651 m)     Wt Readings from Last 3 Encounters:   06/15/21 140 lb (63.5 kg)   20 140 lb (63.5 kg)   20 145 lb (65.8 kg)        Physical Exam    Exam:  Const: Appears comfortable. No signs of acute distress present. Head/Face: Atraumatic, normocephalic on inspection. Eyes: No discharge from the eyes. Sclerae clear. ENMT:    Neck: Supple. Palpation reveals no adenopathy. No masses appreciated. No JVD. Resp: Respirations are unlabored. Clear to auscultation bilaterally. No rales, rhonchi or wheezes appreciated over the  lungs bilaterally. CV: RRR   Extremities: No clubbing or cyanosis. No edema of the lower limbs  bilaterally. No calf inflammation or tenderness. Abdomen: Abdomen is soft, nontender, and nondistended. No abdominal masses  appreciated. No palpable hepatosplenomegaly. Bowel sounds are normoactive. Skin: Dry and warm with no rash. Muscular skeletal: No acute joint inflammation.   Neuro:Grossly intact without focal deficit  No CVA tenderness    Office Labs This Visit :  Results for orders placed or performed in visit on 06/15/21   POCT Urinalysis No Micro (Auto)   Result Value Ref Range    Color, UA yellow     Clarity, UA clear     Glucose, UA POC negative     Bilirubin, UA negative     Ketones, UA negative     Spec Grav, UA 1.020     Blood, UA POC negative     pH, UA 6.2     Protein, UA POC negative Urobilinogen, UA 0.2     Leukocytes, UA negative     Nitrite, UA negative                     Assessment and Plan:    1. Urinary tract infection without hematuria, site unspecified-persistent symptoms  Counseled extensively. Differential reviewed, including serious etiologies. She does get about 2 UTIs per year. She states she was seen in urgent care, given Macrobid, persistent symptoms and so out day and half ago given Bactrim. States culture was positive. Urinalysis now negative. Culture pending. Still with frequency and urgency. Pyridium not particularly helpful she states. Makes me question the urinary tract. Denies vaginal symptoms but possible yeast infection from 2 antibiotics and we did discuss empiric treatment and she is agreeable, pros and cons reviewed, defers exam.  Proper hydration reviewed. Role of cranberry reviewed. We did offer urology consultation she defers now as well as abdominal imaging. Continue current antibiotic therapy with risk-benefit reviewed including C. difficile, and allergic reaction. Probiotic usage reviewed. Await culture and further recommendations to follow. If culture negative and symptoms resolved no further work-up for this unless she would like referred for the recurrent UTIs. Preventative measures reviewed. Culture positive with resistance we will adjust antibiotic. If culture negative and continued symptoms we will need further investigation. she is going to tentatively plan follow-up Friday sooner as needed. - POCT Urinalysis No Micro (Auto)  - fluconazole (DIFLUCAN) 150 MG tablet; Take 1 po qd x 1. May repeat  x 1 in 7 days if needed  Dispense: 2 tablet; Refill: 0  - Culture, Urine; Future    2. Health maintenance examination  Overdue, encourage, check insurance, get blood work. Also recommended colon cancer screening/colonoscopy and she will think about it. - Lipid Panel; Future  - TSH without Reflex;  Future  - Comprehensive Metabolic Panel;

## 2021-06-18 LAB — URINE CULTURE, ROUTINE: NORMAL

## 2021-06-18 NOTE — RESULT ENCOUNTER NOTE
Urine culture completely negative so no evidence of UTI at the time of that culture.   Continue per office visit plan

## 2021-07-12 DIAGNOSIS — Z00.00 HEALTH MAINTENANCE EXAMINATION: ICD-10-CM

## 2021-07-12 LAB
ALBUMIN SERPL-MCNC: 4.2 G/DL (ref 3.5–5.2)
ALP BLD-CCNC: 101 U/L (ref 35–104)
ALT SERPL-CCNC: 13 U/L (ref 0–32)
ANION GAP SERPL CALCULATED.3IONS-SCNC: 12 MMOL/L (ref 7–16)
AST SERPL-CCNC: 17 U/L (ref 0–31)
BASOPHILS ABSOLUTE: 0.04 E9/L (ref 0–0.2)
BASOPHILS RELATIVE PERCENT: 0.9 % (ref 0–2)
BILIRUB SERPL-MCNC: 0.3 MG/DL (ref 0–1.2)
BUN BLDV-MCNC: 11 MG/DL (ref 6–20)
CALCIUM SERPL-MCNC: 8.6 MG/DL (ref 8.6–10.2)
CHLORIDE BLD-SCNC: 102 MMOL/L (ref 98–107)
CHOLESTEROL, TOTAL: 197 MG/DL (ref 0–199)
CO2: 25 MMOL/L (ref 22–29)
CREAT SERPL-MCNC: 0.8 MG/DL (ref 0.5–1)
EOSINOPHILS ABSOLUTE: 0.25 E9/L (ref 0.05–0.5)
EOSINOPHILS RELATIVE PERCENT: 5.5 % (ref 0–6)
GFR AFRICAN AMERICAN: >60
GFR NON-AFRICAN AMERICAN: >60 ML/MIN/1.73
GLUCOSE BLD-MCNC: 83 MG/DL (ref 74–99)
HCT VFR BLD CALC: 42.9 % (ref 34–48)
HDLC SERPL-MCNC: 103 MG/DL
HEMOGLOBIN: 13.9 G/DL (ref 11.5–15.5)
IMMATURE GRANULOCYTES #: 0.01 E9/L
IMMATURE GRANULOCYTES %: 0.2 % (ref 0–5)
LDL CHOLESTEROL CALCULATED: 78 MG/DL (ref 0–99)
LYMPHOCYTES ABSOLUTE: 1.64 E9/L (ref 1.5–4)
LYMPHOCYTES RELATIVE PERCENT: 36.3 % (ref 20–42)
MCH RBC QN AUTO: 31.4 PG (ref 26–35)
MCHC RBC AUTO-ENTMCNC: 32.4 % (ref 32–34.5)
MCV RBC AUTO: 96.8 FL (ref 80–99.9)
MONOCYTES ABSOLUTE: 0.37 E9/L (ref 0.1–0.95)
MONOCYTES RELATIVE PERCENT: 8.2 % (ref 2–12)
NEUTROPHILS ABSOLUTE: 2.21 E9/L (ref 1.8–7.3)
NEUTROPHILS RELATIVE PERCENT: 48.9 % (ref 43–80)
PDW BLD-RTO: 12.4 FL (ref 11.5–15)
PLATELET # BLD: 247 E9/L (ref 130–450)
PMV BLD AUTO: 9.9 FL (ref 7–12)
POTASSIUM SERPL-SCNC: 3.9 MMOL/L (ref 3.5–5)
RBC # BLD: 4.43 E12/L (ref 3.5–5.5)
SODIUM BLD-SCNC: 139 MMOL/L (ref 132–146)
TOTAL PROTEIN: 6.4 G/DL (ref 6.4–8.3)
TRIGL SERPL-MCNC: 78 MG/DL (ref 0–149)
TSH SERPL DL<=0.05 MIU/L-ACNC: 1.74 UIU/ML (ref 0.27–4.2)
VLDLC SERPL CALC-MCNC: 16 MG/DL
WBC # BLD: 4.5 E9/L (ref 4.5–11.5)

## 2021-07-14 DIAGNOSIS — G40.909 SEIZURE DISORDER (HCC): ICD-10-CM

## 2021-07-14 RX ORDER — PHENYTOIN SODIUM 100 MG/1
CAPSULE, EXTENDED RELEASE ORAL
Qty: 270 CAPSULE | Refills: 3 | Status: SHIPPED
Start: 2021-07-14 | End: 2022-06-29

## 2021-08-26 ENCOUNTER — OFFICE VISIT (OUTPATIENT)
Dept: NEUROLOGY | Age: 51
End: 2021-08-26
Payer: COMMERCIAL

## 2021-08-26 VITALS
SYSTOLIC BLOOD PRESSURE: 110 MMHG | DIASTOLIC BLOOD PRESSURE: 76 MMHG | RESPIRATION RATE: 20 BRPM | BODY MASS INDEX: 23.32 KG/M2 | HEART RATE: 77 BPM | WEIGHT: 140 LBS | HEIGHT: 65 IN | TEMPERATURE: 96.9 F | OXYGEN SATURATION: 99 %

## 2021-08-26 DIAGNOSIS — G40.909 SEIZURE DISORDER (HCC): Primary | ICD-10-CM

## 2021-08-26 PROCEDURE — 99213 OFFICE O/P EST LOW 20 MIN: CPT | Performed by: CLINICAL NURSE SPECIALIST

## 2021-08-26 NOTE — PROGRESS NOTES
Magda Andrade is a 52 y.o. right handed female     Long history of epilepsy   Tried numerous AEDs in the past   Unable to tolerate Keppra (marked alopecia)    Tried generic phenytoin without success in past -- increased auras    Has been seizure free for many years - maintained on Dilantin CARMINA -- but it was becoming quite expensive     Switched to generic hesitantly and has done well     Hesitant to try alternatives because of issues with Keppra   No auras reported in years   Thankfully, no seizures reported in years     Not missing any doses of medications     No tongue biting   No incontinence  No seizure activity     Medically, she denies any difficulties - no new medications     Labs reviewed from last month    No chest pain or palpitations  No SOB  No vertigo, lightheadedness or loss of consciousness  No falls, tripping or stumbling  No incontinence of bowels or bladder  No itching or bruising appreciated  No numbness, tingling or focal arm/leg weakness    ROS otherwise negative     Prior to Visit Medications    Medication Sig Taking? Authorizing Provider   phenytoin (DILANTIN) 100 MG ER capsule TAKE 3 CAPSULES BY MOUTH DAILY Yes Selina Allen APRN - CNS   sulfamethoxazole-trimethoprim (BACTRIM DS;SEPTRA DS) 800-160 MG per tablet TAKE 1 TABLET BY MOUTH TWICE DAILY FOR 5 DAYS Yes Historical Provider, MD   fluconazole (DIFLUCAN) 150 MG tablet Take 1 po qd x 1.  May repeat  x 1 in 7 days if needed Yes Kate Jose MD   estradiol (VIVELLE) 0.1 MG/24HR UNW AND MIGUEL 1 PA TO THE SKIN  TWICE A WEEK Yes Historical Provider, MD   PREMARIN 0.9 MG tablet Take 0.9 mg by mouth daily  Yes Historical Provider, MD   venlafaxine (EFFEXOR-XR) 75 MG XR capsule Take 75 mg by mouth daily  Yes Historical Provider, MD   RESTASIS 0.05 % EMUL  Yes Historical Provider, MD     Allergies as of 08/26/2021    (No Known Allergies)     Objective:     /76 (Site: Right Upper Arm, Position: Sitting, Cuff Size: Medium Adult)   Pulse 77   Temp 96.9 °F (36.1 °C)   Resp 20   Ht 5' 5\" (1.651 m)   Wt 140 lb (63.5 kg)   SpO2 99%   BMI 23.30 kg/m²   Afebrile      General appearance: alert, appears stated age and cooperative  Head: Normocephalic, without obvious abnormality, atraumatic  Extremities:no cyanosis or edema  Pulses: 2+ and symmetric  Skin:  No rashes or lesions     Mental Status: alert and oriented to person place and time    Speech: clear  Language: normal    Cranial Nerves:  I: smell    II: visual acuity     II: visual fields Full   II: pupils MARIBEL   III,VII: ptosis    III,IV,VI: extraocular muscles  Full ROM   V: mastication Normal   V: facial light touch sensation  Normal   V,VII: corneal reflex  Present   VII: facial muscle function - upper     VII: facial muscle function - lower Normal   VIII: hearing Normal   IX: soft palate elevation  Normal   IX,X: gag reflex    XI: trapezius strength  5/5   XI: sternocleidomastoid strength 5/5   XI: neck extension strength  5/5   XII: tongue strength  Normal     Motor:  5/5 throughout  Normal bulk and tone    Sensory:  LT normal  Vibration normal    Coordination:   FN, FFM and LALO normal  HS normal    Gait:  normal    DTR:   Right Brachioradialis reflex 2+  Left Brachioradialis reflex 2+  Right Biceps reflex 2+  Left Biceps reflex 2+  Right Quadriceps reflex 2+  Left Quadriceps reflex 2+  Right Achilles reflex 2+  Left Achilles reflex 2+    Laboratory/Radiology:     CBC with Differential:    Lab Results   Component Value Date    WBC 4.5 07/12/2021    RBC 4.43 07/12/2021    HGB 13.9 07/12/2021    HCT 42.9 07/12/2021     07/12/2021    MCV 96.8 07/12/2021    MCH 31.4 07/12/2021    MCHC 32.4 07/12/2021    RDW 12.4 07/12/2021    SEGSPCT 81 03/01/2013    LYMPHOPCT 36.3 07/12/2021    MONOPCT 8.2 07/12/2021    EOSPCT 5.1 03/03/2020    BASOPCT 0.9 07/12/2021    MONOSABS 0.37 07/12/2021    LYMPHSABS 1.64 07/12/2021    EOSABS 0.25 07/12/2021    BASOSABS 0.04 07/12/2021     CMP:    Lab Results Component Value Date     07/12/2021    K 3.9 07/12/2021     07/12/2021    CO2 25 07/12/2021    BUN 11 07/12/2021    CREATININE 0.8 07/12/2021    GFRAA >60 07/12/2021    LABGLOM >60 07/12/2021    GLUCOSE 83 07/12/2021    PROT 6.4 07/12/2021    LABALBU 4.2 07/12/2021    CALCIUM 8.6 07/12/2021    BILITOT 0.3 07/12/2021    ALKPHOS 101 07/12/2021    AST 17 07/12/2021    ALT 13 07/12/2021     Labs personally reviewed at this time     Assessment:     Simple partial seizures with secondary generalization   Stable on phenytoin 300mg daily     No seizures reported    Plan:     Continue AED for now   Discussed at length about the possibility of switching if she is unable to tolerate generic formulation     RTO 1 year    Call with new difficulties    PK Banks - CNS  9:06 AM  8/26/2021

## 2021-11-10 ENCOUNTER — OFFICE VISIT (OUTPATIENT)
Dept: FAMILY MEDICINE CLINIC | Age: 51
End: 2021-11-10
Payer: COMMERCIAL

## 2021-11-10 VITALS
WEIGHT: 140 LBS | TEMPERATURE: 97.4 F | BODY MASS INDEX: 23.3 KG/M2 | DIASTOLIC BLOOD PRESSURE: 66 MMHG | HEART RATE: 78 BPM | SYSTOLIC BLOOD PRESSURE: 124 MMHG | OXYGEN SATURATION: 98 %

## 2021-11-10 DIAGNOSIS — R39.9 UTI SYMPTOMS: ICD-10-CM

## 2021-11-10 DIAGNOSIS — R30.0 DYSURIA: Primary | ICD-10-CM

## 2021-11-10 DIAGNOSIS — R30.0 DYSURIA: ICD-10-CM

## 2021-11-10 LAB
BILIRUBIN, POC: NORMAL
BLOOD URINE, POC: NORMAL
CLARITY, POC: CLEAR
COLOR, POC: YELLOW
GLUCOSE URINE, POC: NORMAL
KETONES, POC: NORMAL
LEUKOCYTE EST, POC: NORMAL
NITRITE, POC: NORMAL
PH, POC: 7
PROTEIN, POC: NORMAL
SPECIFIC GRAVITY, POC: 1.01
UROBILINOGEN, POC: NORMAL

## 2021-11-10 PROCEDURE — 99203 OFFICE O/P NEW LOW 30 MIN: CPT | Performed by: PHYSICIAN ASSISTANT

## 2021-11-10 PROCEDURE — 81002 URINALYSIS NONAUTO W/O SCOPE: CPT | Performed by: PHYSICIAN ASSISTANT

## 2021-11-10 RX ORDER — PHENAZOPYRIDINE HYDROCHLORIDE 200 MG/1
200 TABLET, FILM COATED ORAL 3 TIMES DAILY PRN
Qty: 9 TABLET | Refills: 0 | Status: SHIPPED | OUTPATIENT
Start: 2021-11-10 | End: 2021-11-13

## 2021-11-10 NOTE — PROGRESS NOTES
11/10/21  Luly Lopez : 1970 Sex: female  Age 54 y.o. Subjective:  Chief Complaint   Patient presents with    Urinary Tract Infection     since  teledoc given nitrofurantoin w little result. HPI:   Deonte Marcum , 54 y.o. female presents to [facility] for evaluation of urinary tract infection    HPI  28-year-old female presents to [facility] for evaluation of a urinary tract infection. The patient started with the symptoms essentially on . The patient did a teledoc conference and it was found to have UTI and started on nitrofurantoin. The patient states that she still having symptoms although it is improving. The patient is not having any leaking at this point. The patient still going more frequently. Patient went to make sure that she got a culture to ensure that she was on the right medication. Her last dose of nitrofurantoin is tomorrow      ROS:   Unless otherwise stated in this report the patient's positive and negative responses for review of systems for constitutional, eyes, ENT, cardiovascular, respiratory, gastrointestinal, neurological, , musculoskeletal, and integument systems and related systems to the presenting problem are either stated in the history of present illness or were not pertinent or were negative for the symptoms and/or complaints related to the presenting medical problem. Positives and pertinent negatives as per HPI. All others reviewed and are negative. PMH:     Past Medical History:   Diagnosis Date    Endometriosis     IBS (irritable bowel syndrome)     Ovarian cyst     [facility])        Past Surgical History:   Procedure Laterality Date    BREAST SURGERY      CHOLECYSTECTOMY      HYSTERECTOMY         History reviewed. No pertinent family history.     Medications:     Current Outpatient Medications:     phenazopyridine (PYRIDIUM) 200 MG tablet, Take 1 tablet by mouth 3 times daily as needed for Pain, Disp: 9 tablet, Rfl: 0    phenytoin (DILANTIN) 100 MG ER capsule, TAKE 3 CAPSULES BY MOUTH DAILY, Disp: 270 capsule, Rfl: 3    sulfamethoxazole-trimethoprim (BACTRIM DS;SEPTRA DS) 800-160 MG per tablet, TAKE 1 TABLET BY MOUTH TWICE DAILY FOR 5 DAYS, Disp: , Rfl:     fluconazole (DIFLUCAN) 150 MG tablet, Take 1 po qd x 1. May repeat  x 1 in 7 days if needed, Disp: 2 tablet, Rfl: 0    estradiol (VIVELLE) 0.1 MG/24HR, UNW AND MIGUEL 1 PA TO THE SKIN  TWICE A WEEK, Disp: , Rfl: 0    PREMARIN 0.9 MG tablet, Take 0.9 mg by mouth daily , Disp: , Rfl:     venlafaxine (EFFEXOR-XR) 75 MG XR capsule, Take 75 mg by mouth daily , Disp: , Rfl:     RESTASIS 0.05 % EMUL,   , Disp: , Rfl:     Allergies:   No Known Allergies    Social History:     Social History     Tobacco Use    Smoking status: Never Smoker    Smokeless tobacco: Never Used   Substance Use Topics    Alcohol use: No     Alcohol/week: 0.0 standard drinks    Drug use: No       Patient lives at home. Physical Exam:     Vitals:    11/10/21 0844   BP: 124/66   Pulse: 78   Temp: 97.4 °F (36.3 °C)   SpO2: 98%   Weight: 140 lb (63.5 kg)       Exam:  Physical Exam  Nurses note and vital signs reviewed and patient is not hypoxic. General: The patient appears well and in no apparent distress. Patient is resting comfortably on cart. Skin: Warm, dry, no pallor noted. There is no rash noted. Head: Normocephalic, atraumatic. Eye: Normal conjunctiva  Ears, Nose, Mouth, and Throat: Wearing a mask  Cardiovascular: Regular Rate and Rhythm  Respiratory: Patient is in no distress, no accessory muscle use, lungs are clear to auscultation, no wheezing, crackles or rhonchi  Back: Non-tender, no CVA tenderness bilaterally to percussion. GI: Normal bowel sounds, no tenderness to palpation, no masses appreciated. No rebound, guarding, or rigidity noted.   Neurological: A&O x4, normal speech        Testing:     Results for orders placed or performed in visit on 11/10/21   POCT Urinalysis no Micro   Result Value Ref Range    Color, UA yellow     Clarity, UA clear     Glucose, UA POC neg     Bilirubin, UA neg     Ketones, UA neg     Spec Grav, UA 1.010     Blood, UA POC neg     pH, UA 7.0     Protein, UA POC neg     Urobilinogen, UA 0.2eu     Leukocytes, UA neg     Nitrite, UA neg            Medical Decision Making:     Vital signs reviewed    Past medical history reviewed. Allergies reviewed. Medications reviewed. Patient on arrival does not appear to be in any apparent distress or discomfort. The patient has been seen and evaluated. The patient does not appear to be toxic or lethargic. The patient's urinalysis was unremarkable. The patient will have a urine culture set up. The patient will finish out the nitrofurantoin. Will prescribe Pyridium for the patient. The patient will continue to hydrate. The patient is to return to express care or go directly to the emergency department should any of the signs or symptoms worsen. The patient is to followup with primary care physician in 2-3 days for repeat evaluation. The patient has no other questions or concerns at this time the patient will be discharged home. Clinical Impression:   Luly was seen today for urinary tract infection. Diagnoses and all orders for this visit:    Dysuria  -     POCT Urinalysis no Micro  -     Culture, Urine; Future    UTI symptoms    Other orders  -     phenazopyridine (PYRIDIUM) 200 MG tablet; Take 1 tablet by mouth 3 times daily as needed for Pain        The patient is to call for any concerns or return if any of the signs or symptoms worsen. The patient is to follow-up with PCP in the next 2-3 days for repeat evaluation repeat assessment or go directly to the emergency department.      SIGNATURE: Pedro Bruce III, PA-C

## 2021-11-12 LAB — URINE CULTURE, ROUTINE: NORMAL

## 2021-12-06 ENCOUNTER — OFFICE VISIT (OUTPATIENT)
Dept: PRIMARY CARE CLINIC | Age: 51
End: 2021-12-06
Payer: COMMERCIAL

## 2021-12-06 VITALS
TEMPERATURE: 97.8 F | HEART RATE: 82 BPM | SYSTOLIC BLOOD PRESSURE: 118 MMHG | WEIGHT: 142 LBS | BODY MASS INDEX: 23.63 KG/M2 | OXYGEN SATURATION: 96 % | DIASTOLIC BLOOD PRESSURE: 64 MMHG

## 2021-12-06 DIAGNOSIS — Z00.00 HEALTH MAINTENANCE EXAMINATION: Primary | ICD-10-CM

## 2021-12-06 DIAGNOSIS — G40.909 SEIZURE DISORDER (HCC): Chronic | ICD-10-CM

## 2021-12-06 DIAGNOSIS — Z12.11 COLON CANCER SCREENING: ICD-10-CM

## 2021-12-06 PROCEDURE — 99396 PREV VISIT EST AGE 40-64: CPT | Performed by: FAMILY MEDICINE

## 2021-12-06 SDOH — ECONOMIC STABILITY: FOOD INSECURITY: WITHIN THE PAST 12 MONTHS, THE FOOD YOU BOUGHT JUST DIDN'T LAST AND YOU DIDN'T HAVE MONEY TO GET MORE.: NEVER TRUE

## 2021-12-06 SDOH — ECONOMIC STABILITY: FOOD INSECURITY: WITHIN THE PAST 12 MONTHS, YOU WORRIED THAT YOUR FOOD WOULD RUN OUT BEFORE YOU GOT MONEY TO BUY MORE.: NEVER TRUE

## 2021-12-06 ASSESSMENT — SOCIAL DETERMINANTS OF HEALTH (SDOH): HOW HARD IS IT FOR YOU TO PAY FOR THE VERY BASICS LIKE FOOD, HOUSING, MEDICAL CARE, AND HEATING?: NOT HARD AT ALL

## 2021-12-06 NOTE — ASSESSMENT & PLAN NOTE
Counseled extensively. Differential reviewed, including serious etiologies. Follows with [facility], last seizure many yrs ago, never while on meds. R/b reviewed. Doing well. Counseled.

## 2021-12-06 NOTE — PROGRESS NOTES
21  Luly Lopez : 1970 Sex: female  Age: 54 y.o. Chief Complaint   Patient presents with    Annual Exam    Other     declines flu vaccine        HPI:   Feels very well. No further sxs. Review of Systems  ROS:  Const: Denies chills, fever, malaise and sweats. Eyes: Denies discharge, pain, redness and visual disturbance. ENMT: Denies earaches, other ear symptoms. Denies nasal or sinus symptoms other than stated  above. Denies mouth and tongue lesions and sore throat. CV: Denies chest discomfort, pain; diaphoresis, dizziness, edema, lightheadedness, orthopnea,  palpitations, syncope and near syncopal episode or any exertional symptoms  Resp: Denies cough, hemoptysis, pleuritic pain, SOB, sputum production and wheezing. GI: Denies abdominal pain, change in bowel habits, hematochezia, melena, nausea and vomiting. : Denies urinary symptoms including dysuria , urgency, frequency or hematuria. Musculo: Denies musculoskeletal symptoms. Skin: Denies bruising and rash. Neuro: Denies headache, numbness, stiff neck, tingling and focal weakness slurred speech or facial  droop  Hema/Lymph: Denies bleeding/bruising tendency and enlarged lymph nodes. Health Maintenance:  Proper diet reviewed including Mediterranean and DASH diets. Counseled on healthy weight, appropriate exercise, avoidance of tobacco, and recommendations for minimal to no alcohol consumption. Counseled on the potential pros and cons of vitamins and supplements. Reviewed the recommendations and risk/benefits of vaccines including Moderna x 2, planning booster Wed,  Td/Tdap (),  pneumovax,  prevnar 13 , flu vaccine (planning to get at work), Hepatitis vaccines (had B series, counseled A),  and shingrix (patient had chicken pox). HIV and Hep C screening guidelines were reviewed. Importance of regular eye and dental exams and health reviewed. Risks/Benefits of ASA reviewed and discussed latest guidelines.  [facility] protection reviewed. Notify if any new or changing moles/skin lesions, etc. Dexa Scan indications/ risk factors for osteoporotic fractures and prevention reviewed. Colonoscopy recommendations reviewed. Pt denies change in bowel habits or [address] of colon polyps/CA. Fit test offered. Had cscope x 2, last  1/2013 neg. Prefers to wait 10yrs unless sxs or abnl FIT    Indications for EKG and other   cardiac testing including referrals, stress testing,  2d echo, ect. dasx. Reviewed indications for other testing such as  PFT's.and  indications for imaging including brain, carotid, chest, abdominal, aortic .  dasx. Counseled on instructions regarding, and importance of monthly breast exams, yearly physician exams (sooner if sx's). Indications for mammograms reviewed and importance. Dexa Scan indications/ risk factors for osteoporotic fractures (and associated M/M) and preventative measures reviewed. Importance of regular GYN exams reviewed and pt to follow here or with her gynecologist as directed. [facility] thru them, we will try to retrieve and she is due again in feb. on effexor and premarin thru gyn, r/b reviewed.  Managed by them        Most Recent Labs  CBC  Lab Results   Component Value Date    WBC 4.5 07/12/2021    WBC 4.7 03/03/2020    WBC 4.4 08/28/2018    RBC 4.43 07/12/2021    RBC 4.45 03/03/2020    RBC 4.30 08/28/2018    HGB 13.9 07/12/2021    HGB 14.4 03/03/2020    HGB 14.1 08/28/2018    HCT 42.9 07/12/2021    HCT 41.1 03/03/2020    HCT 41.4 08/28/2018    MCV 96.8 07/12/2021    MCV 92.4 03/03/2020    MCV 96.7 08/16/2015     07/12/2021     03/03/2020     08/16/2015      CMP  Lab Results   Component Value Date     07/12/2021     03/04/2020     08/28/2018    K 3.9 07/12/2021    K 4.0 03/04/2020    K 4.1 08/28/2018     07/12/2021     03/04/2020     08/28/2018    CO2 25 07/12/2021    CO2 31 03/04/2020    CO2 26 08/16/2015    ANIONGAP 12 07/12/2021 ANIONGAP 11 08/16/2015    GLUCOSE 83 07/12/2021    GLUCOSE 88 03/04/2020    GLUCOSE 106 08/16/2015    BUN 11 07/12/2021    BUN 10 03/04/2020    BUN 15 08/28/2018    CREATININE 0.8 07/12/2021    CREATININE 0.68 03/04/2020    CREATININE 0.6 08/16/2015    LABGLOM >60 07/12/2021    LABGLOM 104 03/04/2020    LABGLOM >60 08/16/2015    LABGLOM >60 11/28/2014    GFRAA >60 07/12/2021    GFRAA >60 08/16/2015    GFRAA >60 11/28/2014    CALCIUM 8.6 07/12/2021    CALCIUM 9.0 03/04/2020    CALCIUM 8.9 08/16/2015    PROT 6.4 07/12/2021    PROT 6.5 08/16/2015    PROT 6.3 11/28/2014    LABALBU 4.2 07/12/2021    LABALBU 4.5 03/04/2020    LABALBU 4.1 08/16/2015    BILITOT 0.3 07/12/2021    BILITOT 0.5 03/04/2020    BILITOT 0.5 08/16/2015    ALKPHOS 101 07/12/2021    ALKPHOS 100 03/04/2020    ALKPHOS 87 08/16/2015    AST 17 07/12/2021    AST 15 03/04/2020    AST 16 08/16/2015    ALT 13 07/12/2021    ALT 11 03/04/2020    ALT 12 08/16/2015     A1C  No results found for: LABA1C  TSH  Lab Results   Component Value Date    TSH 1.740 07/12/2021    TSH 2.58 03/04/2020    TSH 0.499 03/02/2013     FREET4  No results found for: [id number]  LIPID  Lab Results   Component Value Date    CHOL 197 07/12/2021    CHOL 202 03/03/2020     07/12/2021    HDL 93 03/03/2020    LDLCALC 78 07/12/2021    LDLCALC 88 03/03/2020    TRIG 78 07/12/2021    TRIG 111 03/03/2020    CHOLHDLRATIO 2.2 03/03/2020     VITAMIN D  No results found for: VITD25  MAGNESIUM  Lab Results   Component Value Date    MG 1.9 03/04/2020      PHOS  No results found for: PHOS   CAROLINE   Lab Results   Component Value Date    CAROLINE NEGATIVE 10/05/2012     RHEUMATOID FACTOR  No results found for: RF  PSA  No results found for: PSA   HEPATITIS C  No results found for: HCVABI  HIV  No results found for: ZSB8CVQ, HIV1QT  UA  Lab Results   Component Value Date    COLORU yellow 11/10/2021    COLORU yellow 06/15/2021    COLORU Yellow 03/04/2020    COLORU Yellow 08/16/2015    COLORU Yellow 11/28/2014    CLARITYU clear 11/10/2021    CLARITYU clear 06/15/2021    CLARITYU Clear 03/04/2020    CLARITYU Clear 08/16/2015    CLARITYU Clear 11/28/2014    GLUCOSEU neg 11/10/2021    GLUCOSEU negative 06/15/2021    GLUCOSEU neg 03/04/2020    GLUCOSEU Negative 08/16/2015    GLUCOSEU Negative 11/28/2014    BILIRUBINUR neg 11/10/2021    BILIRUBINUR negative 06/15/2021    BILIRUBINUR Negative 03/04/2020    BILIRUBINUR Negative 08/16/2015    BILIRUBINUR Negative 11/28/2014    BILIRUBINUR NEGATIVE 03/01/2013    KETUA neg 11/10/2021    KETUA negative 06/15/2021    KETUA Negative 03/04/2020    KETUA Negative 08/16/2015    KETUA Negative 11/28/2014    SPECGRAV 1.010 11/10/2021    SPECGRAV 1.020 06/15/2021    SPECGRAV <=1.005 08/16/2015    SPECGRAV 1.010 11/28/2014    SPECGRAV 1.010 03/01/2013    BLOODU neg 11/10/2021    BLOODU negative 06/15/2021    BLOODU Negative 03/04/2020    BLOODU Negative 08/16/2015    BLOODU Negative 11/28/2014    PHUR 7.0 11/10/2021    PHUR 6.2 06/15/2021    PHUR 6.0 03/04/2020    PHUR 6.5 08/16/2015    PHUR 8.0 11/28/2014    PROTEINU neg 11/10/2021    PROTEINU negative 06/15/2021    PROTEINU Negative 03/04/2020    PROTEINU Negative 08/16/2015    PROTEINU Negative 11/28/2014    UROBILINOGEN 0.2 08/16/2015    UROBILINOGEN 0.2 11/28/2014    UROBILINOGEN 0.2 03/01/2013    NITRU Negative 03/04/2020    NITRU Negative 08/16/2015    NITRU Negative 11/28/2014    LEUKOCYTESUR neg 11/10/2021    LEUKOCYTESUR negative 06/15/2021    LEUKOCYTESUR Negative 03/04/2020    LEUKOCYTESUR Negative 08/16/2015    LEUKOCYTESUR Negative 11/28/2014     Urine Micro/Albumin Ratio  No results found for: MALBCR      Current Outpatient Medications:     phenytoin (DILANTIN) 100 MG ER capsule, TAKE 3 CAPSULES BY MOUTH DAILY, Disp: 270 capsule, Rfl: 3    PREMARIN 0.9 MG tablet, Take 0.9 mg by mouth daily , Disp: , Rfl:     venlafaxine (EFFEXOR-XR) 75 MG XR capsule, Take 75 mg by mouth daily , Disp: , Rfl:     RESTASIS 0.05 % EDDA,   , Disp: , Rfl:     estradiol (VIVELLE) 0.1 MG/24HR, UNW AND MIGUEL 1 PA TO THE SKIN  TWICE A WEEK, Disp: , Rfl: 0  No Known Allergies    Past Medical History:   Diagnosis Date    Endometriosis     IBS (irritable bowel syndrome)     Ovarian cyst     Seizure [facility])      Past Surgical History:   Procedure Laterality Date    BREAST SURGERY      CHOLECYSTECTOMY      HYSTERECTOMY       No family history on file. Social History     Tobacco Use    Smoking status: Never Smoker    Smokeless tobacco: Never Used   Substance Use Topics    Alcohol use: No     Alcohol/week: 0.0 standard drinks    Drug use: No      Social History     Social History Narrative    PMH:    Health Maintenance:    Mammogram - (2016)    Mammogram Screening - (2016)    Tetanus Immunization - (2016)    Medical Problems:    Seizure Disorder - since age 25. Dr Loren Hartley    GERD - Had EGD/C-scope by Dr Tahira Cali and Ph probe     IBS    endometriosis - surgery to remove ovaries 8/15. wrapped around colon as well        Surgical Hx:    D[place] -     Partial Hysterectomy - both ovaries remain. [facility]    Breast Reduction    EGD - , 2012 showing gastritis/GERD and [facility]    PH probe 2012    Colonoscopy -  [facility]; rudundant colon    laparoscopic - ovarian resection secondary endometriosis    Reviewed, no changes. FH:    Father:    . (Hx)    Mother:    . (Hx)    Dad - doesn't know    Mom - living, HTN    Not close with brother. Maternal grandmother ? type of cancer age 66's, had CABG 52's or 63's    Maternal grandfather   MI age 42's        Paternal grandfather ?type of cancer 66's    Reviewed, no changes. SH:    . (Marital)     -     3 step kids    Personal Habits: Cigarette Use: Nonsmoker. Alcohol: Denies alcohol use.         Vitals:    21 1529   BP: 118/64   Pulse: 82   Temp: 97.8 °F (36.6 °C)   SpO2: 96%   Weight: 142 lb (64.4 kg)       Physical Exam  Exam:  Const: Appears comfortable. No signs of acute distress present. Head/Face: Atraumatic on inspection. Eyes: EOMI in both eyes. No discharge from the eyes. PERRL. Sclerae clear. ENMT: Auditory canals normal. Tympanic membranes: intact and translucent. External nose WNL. Nasal mucosa is clear. Oropharynx: No erythema or exudate. Posterior pharynx is normal.  Neck: Supple. Palpation reveals no adenopathy. No masses appreciated. No JVD. Carotids: no  bruits. Resp: Respirations are unlabored. Clear to auscultation. No rales, rhonchi or wheezes appreciated  over the lungs bilaterally. CV: Rate is regular. Rhythm is regular. No gallop or rubs. No heart murmur appreciated. Extremities: No clubbing, cyanosis, or edema. No calf inflammation or tenderness. Abdomen: Bowel sounds are normoactive. Abdomen is soft, nontender, and nondistended. No  abdominal masses. No palpable hepatosplenomegaly. Lymph: No palpable or visible regional lymphadenopathy. Musculoskeletal: no acute joint inflammation. Skin: Dry and warm with no rash. Skin normal to inspection and palpation overall. Neuro: Alert and oriented. Affect: appropriate. Upper Extremities: 5/5 bilaterally. Lower Extremities:  5/5 bilaterally. Sensation intact to light touch. Reflexes: DTR's are symmetric and 2+ bilaterally. .  Cranial Nerves: Cranial nerves grossly intact. Office Labs This Visit :  No results found for this visit on 12/06/21. Assessment and Plan:    Diagnosis Orders   1. Health maintenance examination  Lipid Panel    TSH without Reflex    Comprehensive Metabolic Panel    CBC Auto Differential    Urinalysis    HIV Screen    Hepatitis C Antibody   2. Colon cancer screening  POCT Fecal Immunochemical Test (FIT)   3. Seizure disorder ([facility] [address]          . Seizure disorder  Counseled extensively. Differential reviewed, including serious etiologies. Follows with Wilver Castillo, last seizure many yrs ago, never while on meds. R/b reviewed. Doing well. Counseled. Health maintenance examination  Health maintenance issues discussed at length as above, 12/6/2021 . Encouraged yearly physicals. Colon cancer screening  FIT. Plan cscope after 1/23, sooner prn. Plan as above. Counseled extensively and differential diagnoses relevant to above were reviewed, including serious etiologies. Side effects and interactions of medications were reviewed. Planning bw and fu 1yr physical, sooner prn      As long as symptoms steadily improve/resolve, and medical conditions follow the expected course, FU as below, sooner PRN. Return in about 1 year (around 12/6/2022), or if symptoms worsen or fail to improve, for physical.        Signs and symptoms to watch for discussed, serious signs and symptoms reviewed. ER if any. Fredo Pride MD    Patients are advised to check with insurance company to ensure coverage and to fully understand benefits and cost prior to any testing. This note was created with the assistance of voice recognition software. Document was reviewed however may contain grammatical errors.

## 2022-01-05 PROBLEM — Z12.11 COLON CANCER SCREENING: Status: RESOLVED | Noted: 2021-12-06 | Resolved: 2022-01-05

## 2022-01-05 PROBLEM — Z00.00 HEALTH MAINTENANCE EXAMINATION: Status: RESOLVED | Noted: 2020-03-17 | Resolved: 2022-01-05

## 2022-06-28 DIAGNOSIS — G40.909 SEIZURE DISORDER (HCC): ICD-10-CM

## 2022-06-29 RX ORDER — PHENYTOIN SODIUM 100 MG/1
CAPSULE, EXTENDED RELEASE ORAL
Qty: 270 CAPSULE | Refills: 3 | Status: SHIPPED | OUTPATIENT
Start: 2022-06-29

## 2022-08-30 ENCOUNTER — OFFICE VISIT (OUTPATIENT)
Dept: NEUROLOGY | Age: 52
End: 2022-08-30
Payer: COMMERCIAL

## 2022-08-30 VITALS
SYSTOLIC BLOOD PRESSURE: 111 MMHG | HEART RATE: 78 BPM | BODY MASS INDEX: 23.63 KG/M2 | OXYGEN SATURATION: 98 % | WEIGHT: 142 LBS | TEMPERATURE: 98.2 F | DIASTOLIC BLOOD PRESSURE: 78 MMHG

## 2022-08-30 DIAGNOSIS — G40.909 SEIZURE DISORDER (HCC): Primary | ICD-10-CM

## 2022-08-30 PROCEDURE — 99213 OFFICE O/P EST LOW 20 MIN: CPT | Performed by: CLINICAL NURSE SPECIALIST

## 2022-08-30 NOTE — PROGRESS NOTES
Roshan England is a 50 y.o. right handed female     Long history of epilepsy   Tried numerous AEDs in the past   Unable to tolerate Keppra (marked alopecia)    Tried generic phenytoin without success in past -- increased auras    Has been seizure free for many years - maintained on Dilantin CARMINA -- but it was becoming quite expensive     Switched to generic hesitantly and has done well     Hesitant to try alternatives because of issues with Keppra     No auras reported in years   Thankfully, no seizures reported in years   Not missing any doses of medications     No tongue biting   No incontinence  No seizure activity     Medically, she denies any difficulties - no new medications     Labs ordered     No chest pain or palpitations  No SOB  No vertigo, lightheadedness or loss of consciousness  No falls, tripping or stumbling  No incontinence of bowels or bladder  No itching or bruising appreciated  No numbness, tingling or focal arm/leg weakness    ROS otherwise negative     Prior to Visit Medications    Medication Sig Taking?  Authorizing Provider   phenytoin (DILANTIN) 100 MG ER capsule TAKE 3 CAPSULES BY MOUTH DAILY Yes Patrick Panda APRN - CNS   estradiol (Darcella Boot) 0.1 MG/24HR UNW AND MIGUEL 1 PA TO THE SKIN  TWICE A WEEK Yes Historical Provider, MD   PREMARIN 0.9 MG tablet Take 0.9 mg by mouth daily  Yes Historical Provider, MD   venlafaxine (EFFEXOR-XR) 75 MG XR capsule Take 75 mg by mouth daily  Yes Historical Provider, MD   RESTASIS 0.05 % EMUL  Yes Historical Provider, MD     Allergies as of 08/30/2022    (No Known Allergies)     Objective:     /78 (Site: Right Upper Arm)   Pulse 78   Temp 98.2 °F (36.8 °C)   Wt 142 lb (64.4 kg)   SpO2 98%   BMI 23.63 kg/m²   Afebrile      General appearance: alert, appears stated age and cooperative  Head: Normocephalic, without obvious abnormality, atraumatic  Extremities:no cyanosis or edema  Pulses: 2+ and symmetric  Skin:  No rashes or lesions     Mental Status: alert and oriented to person place and time    Speech: clear  Language: normal    Cranial Nerves:  I: smell    II: visual acuity     II: visual fields Full   II: pupils MARIBEL   III,VII: ptosis    III,IV,VI: extraocular muscles  Full ROM   V: mastication Normal   V: facial light touch sensation  Normal   V,VII: corneal reflex  Present   VII: facial muscle function - upper     VII: facial muscle function - lower Normal   VIII: hearing Normal   IX: soft palate elevation  Normal   IX,X: gag reflex    XI: trapezius strength  5/5   XI: sternocleidomastoid strength 5/5   XI: neck extension strength  5/5   XII: tongue strength  Normal     Motor:  5/5 throughout  Normal bulk and tone    Sensory:  LT normal  Vibration normal    Coordination:   FN, FFM and LALO normal  Gait:  normal    DTR:   Right Brachioradialis reflex 2+  Left Brachioradialis reflex 2+  Right Biceps reflex 2+  Left Biceps reflex 2+  Right Quadriceps reflex 2+  Left Quadriceps reflex 2+  Right Achilles reflex 2+  Left Achilles reflex 2+    Laboratory/Radiology:     CBC with Differential:    Lab Results   Component Value Date/Time    WBC 4.5 07/12/2021 07:38 AM    RBC 4.43 07/12/2021 07:38 AM    HGB 13.9 07/12/2021 07:38 AM    HCT 42.9 07/12/2021 07:38 AM     07/12/2021 07:38 AM    MCV 96.8 07/12/2021 07:38 AM    MCH 31.4 07/12/2021 07:38 AM    MCHC 32.4 07/12/2021 07:38 AM    RDW 12.4 07/12/2021 07:38 AM    SEGSPCT 81 03/01/2013 06:25 AM    LYMPHOPCT 36.3 07/12/2021 07:38 AM    MONOPCT 8.2 07/12/2021 07:38 AM    EOSPCT 5.1 03/03/2020 12:00 AM    BASOPCT 0.9 07/12/2021 07:38 AM    MONOSABS 0.37 07/12/2021 07:38 AM    LYMPHSABS 1.64 07/12/2021 07:38 AM    EOSABS 0.25 07/12/2021 07:38 AM    BASOSABS 0.04 07/12/2021 07:38 AM     CMP:    Lab Results   Component Value Date/Time     07/12/2021 07:38 AM    K 3.9 07/12/2021 07:38 AM     07/12/2021 07:38 AM    CO2 25 07/12/2021 07:38 AM    BUN 11 07/12/2021 07:38 AM    CREATININE 0.8 07/12/2021 07:38 AM    GFRAA >60 07/12/2021 07:38 AM    LABGLOM >60 07/12/2021 07:38 AM    GLUCOSE 83 07/12/2021 07:38 AM    PROT 6.4 07/12/2021 07:38 AM    LABALBU 4.2 07/12/2021 07:38 AM    CALCIUM 8.6 07/12/2021 07:38 AM    BILITOT 0.3 07/12/2021 07:38 AM    ALKPHOS 101 07/12/2021 07:38 AM    AST 17 07/12/2021 07:38 AM    ALT 13 07/12/2021 07:38 AM     Labs personally reviewed at this time     Assessment:     Simple partial seizures with secondary generalization   Stable on phenytoin 300mg daily     No seizures reported    Plan:     Continue AED for now    RTO 1 year    Labs ordered today     Call with new difficulties    Natasha Dueñas, APRN - CNS  8:58 AM  8/30/2022

## 2023-06-26 DIAGNOSIS — G40.909 SEIZURE DISORDER (HCC): ICD-10-CM

## 2023-06-27 RX ORDER — PHENYTOIN SODIUM 100 MG/1
CAPSULE, EXTENDED RELEASE ORAL
Qty: 270 CAPSULE | Refills: 3 | Status: SHIPPED | OUTPATIENT
Start: 2023-06-27

## 2023-08-18 ENCOUNTER — OFFICE VISIT (OUTPATIENT)
Dept: NEUROLOGY | Age: 53
End: 2023-08-18
Payer: COMMERCIAL

## 2023-08-18 VITALS
WEIGHT: 142 LBS | OXYGEN SATURATION: 97 % | HEART RATE: 89 BPM | SYSTOLIC BLOOD PRESSURE: 119 MMHG | TEMPERATURE: 97.7 F | DIASTOLIC BLOOD PRESSURE: 78 MMHG | BODY MASS INDEX: 23.63 KG/M2

## 2023-08-18 DIAGNOSIS — G40.909 SEIZURE DISORDER (HCC): Primary | ICD-10-CM

## 2023-08-18 PROCEDURE — 99213 OFFICE O/P EST LOW 20 MIN: CPT | Performed by: CLINICAL NURSE SPECIALIST

## 2023-08-22 LAB
ALBUMIN SERPL-MCNC: 4.3 G/DL
ALP BLD-CCNC: 97 U/L
ALT SERPL-CCNC: 13 U/L
ANION GAP SERPL CALCULATED.3IONS-SCNC: NORMAL MMOL/L
AST SERPL-CCNC: 19 U/L
BASOPHILS ABSOLUTE: 50 /ΜL
BASOPHILS RELATIVE PERCENT: 1.2 %
BILIRUB SERPL-MCNC: 0.4 MG/DL (ref 0.1–1.4)
BUN BLDV-MCNC: 10 MG/DL
CALCIUM SERPL-MCNC: 8.9 MG/DL
CHLORIDE BLD-SCNC: 103 MMOL/L
CO2: 28 MMOL/L
CREAT SERPL-MCNC: 0.7 MG/DL
EGFR: 105
EOSINOPHILS ABSOLUTE: 218 /ΜL
EOSINOPHILS RELATIVE PERCENT: 5.2 %
GLUCOSE BLD-MCNC: 76 MG/DL
HCT VFR BLD CALC: 40.2 % (ref 36–46)
HEMOGLOBIN: 14.3 G/DL (ref 12–16)
LYMPHOCYTES ABSOLUTE: 1277 /ΜL
LYMPHOCYTES RELATIVE PERCENT: 30.4 %
MCH RBC QN AUTO: 33.3 PG
MCHC RBC AUTO-ENTMCNC: 35.6 G/DL
MCV RBC AUTO: 93.5 FL
MONOCYTES ABSOLUTE: 361 /ΜL
MONOCYTES RELATIVE PERCENT: 8.6 %
NEUTROPHILS ABSOLUTE: 2293 /ΜL
NEUTROPHILS RELATIVE PERCENT: 54.6 %
PDW BLD-RTO: 12.1 %
PLATELET # BLD: 225 K/ΜL
PMV BLD AUTO: 10.1 FL
POTASSIUM SERPL-SCNC: 3.9 MMOL/L
RBC # BLD: 4.3 10^6/ΜL
SODIUM BLD-SCNC: 137 MMOL/L
TOTAL PROTEIN: 6.3
WBC # BLD: 4.2 10^3/ML

## 2023-09-18 DIAGNOSIS — G40.909 SEIZURE DISORDER (HCC): ICD-10-CM

## 2023-10-03 PROCEDURE — 88305 TISSUE EXAM BY PATHOLOGIST: CPT

## 2023-10-03 PROCEDURE — 88305 TISSUE EXAM BY PATHOLOGIST: CPT | Performed by: DERMATOLOGY

## 2023-10-05 ENCOUNTER — LAB REQUISITION (OUTPATIENT)
Dept: LAB | Facility: HOSPITAL | Age: 53
End: 2023-10-05
Payer: COMMERCIAL

## 2023-10-05 DIAGNOSIS — D48.5 NEOPLASM OF UNCERTAIN BEHAVIOR OF SKIN: ICD-10-CM

## 2023-10-10 LAB
LABORATORY COMMENT REPORT: NORMAL
PATH REPORT.FINAL DX SPEC: NORMAL
PATH REPORT.GROSS SPEC: NORMAL
PATH REPORT.MICROSCOPIC SPEC OTHER STN: NORMAL
PATH REPORT.RELEVANT HX SPEC: NORMAL
PATH REPORT.TOTAL CANCER: NORMAL

## 2023-10-24 ENCOUNTER — OFFICE VISIT (OUTPATIENT)
Dept: PRIMARY CARE CLINIC | Age: 53
End: 2023-10-24
Payer: COMMERCIAL

## 2023-10-24 VITALS
BODY MASS INDEX: 24.66 KG/M2 | HEIGHT: 65 IN | WEIGHT: 148 LBS | HEART RATE: 81 BPM | OXYGEN SATURATION: 98 % | DIASTOLIC BLOOD PRESSURE: 68 MMHG | SYSTOLIC BLOOD PRESSURE: 102 MMHG | RESPIRATION RATE: 16 BRPM | TEMPERATURE: 98.1 F

## 2023-10-24 DIAGNOSIS — G40.909 SEIZURE DISORDER (HCC): ICD-10-CM

## 2023-10-24 DIAGNOSIS — Z11.59 ENCOUNTER FOR HEPATITIS C SCREENING TEST FOR LOW RISK PATIENT: ICD-10-CM

## 2023-10-24 DIAGNOSIS — M54.2 CERVICAL PAIN: ICD-10-CM

## 2023-10-24 DIAGNOSIS — Z00.00 HEALTH MAINTENANCE EXAMINATION: Primary | ICD-10-CM

## 2023-10-24 DIAGNOSIS — Z23 ENCOUNTER FOR IMMUNIZATION: ICD-10-CM

## 2023-10-24 DIAGNOSIS — Z12.11 COLON CANCER SCREENING: ICD-10-CM

## 2023-10-24 DIAGNOSIS — Z11.4 ENCOUNTER FOR SCREENING FOR HIV: ICD-10-CM

## 2023-10-24 PROCEDURE — 99396 PREV VISIT EST AGE 40-64: CPT | Performed by: FAMILY MEDICINE

## 2023-10-24 PROCEDURE — 90471 IMMUNIZATION ADMIN: CPT | Performed by: FAMILY MEDICINE

## 2023-10-24 PROCEDURE — 90632 HEPA VACCINE ADULT IM: CPT | Performed by: FAMILY MEDICINE

## 2023-10-24 RX ORDER — CYCLOBENZAPRINE HCL 10 MG
TABLET ORAL
Qty: 30 TABLET | Refills: 1 | Status: SHIPPED | OUTPATIENT
Start: 2023-10-24

## 2023-10-24 SDOH — ECONOMIC STABILITY: FOOD INSECURITY: WITHIN THE PAST 12 MONTHS, YOU WORRIED THAT YOUR FOOD WOULD RUN OUT BEFORE YOU GOT MONEY TO BUY MORE.: NEVER TRUE

## 2023-10-24 SDOH — ECONOMIC STABILITY: INCOME INSECURITY: HOW HARD IS IT FOR YOU TO PAY FOR THE VERY BASICS LIKE FOOD, HOUSING, MEDICAL CARE, AND HEATING?: NOT HARD AT ALL

## 2023-10-24 SDOH — ECONOMIC STABILITY: HOUSING INSECURITY
IN THE LAST 12 MONTHS, WAS THERE A TIME WHEN YOU DID NOT HAVE A STEADY PLACE TO SLEEP OR SLEPT IN A SHELTER (INCLUDING NOW)?: NO

## 2023-10-24 SDOH — ECONOMIC STABILITY: FOOD INSECURITY: WITHIN THE PAST 12 MONTHS, THE FOOD YOU BOUGHT JUST DIDN'T LAST AND YOU DIDN'T HAVE MONEY TO GET MORE.: NEVER TRUE

## 2023-10-24 ASSESSMENT — PATIENT HEALTH QUESTIONNAIRE - PHQ9
SUM OF ALL RESPONSES TO PHQ QUESTIONS 1-9: 0
SUM OF ALL RESPONSES TO PHQ9 QUESTIONS 1 & 2: 0
SUM OF ALL RESPONSES TO PHQ QUESTIONS 1-9: 0
SUM OF ALL RESPONSES TO PHQ QUESTIONS 1-9: 0
1. LITTLE INTEREST OR PLEASURE IN DOING THINGS: 0
SUM OF ALL RESPONSES TO PHQ QUESTIONS 1-9: 0
2. FEELING DOWN, DEPRESSED OR HOPELESS: 0

## 2023-10-24 NOTE — PROGRESS NOTES
Luly Lopez : 1970 Sex: female  Age: 48 y.o. Chief Complaint   Patient presents with    Annual Exam       HPI:     Presents today with her , overall feeling very well. Planning FPC and travel soon. Counseled on travel vaccines and px. chronic intermittent neck tightness, massotherapy helps when she can get them. Flexeril has helped in the past and she would like to have a prescription on hand with precautions. Defers other E/T    Review of Systems  ROS:  Const: Denies chills, fever, malaise and sweats. Eyes: Denies discharge, pain, redness and visual disturbance. ENMT: Denies earaches, other ear symptoms. Denies nasal or sinus symptoms other than stated  above. Denies mouth and tongue lesions and sore throat. CV: Denies chest discomfort, pain; diaphoresis, dizziness, edema, lightheadedness, orthopnea,  palpitations, syncope and near syncopal episode or any exertional symptoms  Resp: Denies cough, hemoptysis, pleuritic pain, SOB, sputum production and wheezing. GI: Denies abdominal pain, change in bowel habits, hematochezia, melena, nausea and vomiting. : Denies urinary symptoms including dysuria , urgency, frequency or hematuria. Musculo: Denies musculoskeletal symptoms. other than above. Skin: Denies bruising and rash. Neuro: Denies headache, numbness, stiff neck, tingling and focal weakness slurred speech or facial  droop  Hema/Lymph: Denies bleeding/bruising tendency and enlarged lymph nodes. Health Maintenance:  Proper diet reviewed including Mediterranean and DASH diets. Counseled on healthy weight, appropriate exercise, avoidance of tobacco, and recommendations for minimal to no alcohol consumption. Counseled on the potential pros and cons of vitamins and supplements.   Reviewed the recommendations and risk/benefits of vaccines including Moderna x 2, counseled on booster,  Td/Tdap (),  prevnar 20 (counseled) flu vaccine (declines), Hepatitis vaccines (had

## 2023-10-25 LAB
ALBUMIN SERPL-MCNC: 4.3 G/DL
ALP BLD-CCNC: 97 U/L
ALT SERPL-CCNC: 14 U/L
ANION GAP SERPL CALCULATED.3IONS-SCNC: NORMAL MMOL/L
ANTIBODY: NON REACTIVE
AST SERPL-CCNC: 16 U/L
BASOPHILS ABSOLUTE: 30 /ΜL
BASOPHILS RELATIVE PERCENT: 0.5 %
BILIRUB SERPL-MCNC: 0.4 MG/DL (ref 0.1–1.4)
BILIRUBIN, URINE: NEGATIVE
BLOOD, URINE: NEGATIVE
BUN BLDV-MCNC: 9 MG/DL
CALCIUM SERPL-MCNC: 8.6 MG/DL
CHLORIDE BLD-SCNC: 102 MMOL/L
CHOLESTEROL, TOTAL: 178 MG/DL
CHOLESTEROL/HDL RATIO: 1.9
CLARITY: CLEAR
CO2: 29 MMOL/L
COLOR: YELLOW
CREAT SERPL-MCNC: 0.61 MG/DL
EGFR: 108
EOSINOPHILS ABSOLUTE: 207 /ΜL
EOSINOPHILS RELATIVE PERCENT: 3.5 %
GLUCOSE BLD-MCNC: 83 MG/DL
GLUCOSE URINE: NORMAL
HCT VFR BLD CALC: 39.4 % (ref 36–46)
HDLC SERPL-MCNC: 92 MG/DL (ref 35–70)
HEMOGLOBIN: 13.9 G/DL (ref 12–16)
HIV AG/AB: NON REACTIVE
KETONES, URINE: NEGATIVE
LDL CHOLESTEROL CALCULATED: 72 MG/DL (ref 0–160)
LEUKOCYTE ESTERASE, URINE: NEGATIVE
LYMPHOCYTES ABSOLUTE: 1215 /ΜL
LYMPHOCYTES RELATIVE PERCENT: 20.6 %
MCH RBC QN AUTO: 32.7 PG
MCHC RBC AUTO-ENTMCNC: 35.3 G/DL
MCV RBC AUTO: 92.7 FL
MONOCYTES ABSOLUTE: 466 /ΜL
MONOCYTES RELATIVE PERCENT: 7.9 %
NEUTROPHILS ABSOLUTE: 3983 /ΜL
NEUTROPHILS RELATIVE PERCENT: 67.5 %
NITRITE, URINE: NEGATIVE
NONHDLC SERPL-MCNC: 86 MG/DL
PDW BLD-RTO: 12.1 %
PH UA: 7.5 (ref 4.5–8)
PLATELET # BLD: 227 K/ΜL
PMV BLD AUTO: 9.9 FL
POTASSIUM SERPL-SCNC: 4.2 MMOL/L
PROTEIN UA: NEGATIVE
RBC # BLD: 4.25 10^6/ΜL
SODIUM BLD-SCNC: 138 MMOL/L
SPECIFIC GRAVITY, URINE: 1
TOTAL PROTEIN: 6.4
TRIGL SERPL-MCNC: 64 MG/DL
TSH SERPL DL<=0.05 MIU/L-ACNC: 1.98 UIU/ML
UROBILINOGEN, URINE: NORMAL
VLDLC SERPL CALC-MCNC: ABNORMAL MG/DL
WBC # BLD: 5.9 10^3/ML

## 2023-10-26 DIAGNOSIS — Z11.4 ENCOUNTER FOR SCREENING FOR HIV: ICD-10-CM

## 2023-10-26 DIAGNOSIS — Z11.59 ENCOUNTER FOR HEPATITIS C SCREENING TEST FOR LOW RISK PATIENT: ICD-10-CM

## 2023-10-26 DIAGNOSIS — Z00.00 HEALTH MAINTENANCE EXAMINATION: ICD-10-CM

## 2023-11-07 ENCOUNTER — OFFICE VISIT (OUTPATIENT)
Dept: SURGERY | Age: 53
End: 2023-11-07
Payer: COMMERCIAL

## 2023-11-07 VITALS
SYSTOLIC BLOOD PRESSURE: 124 MMHG | WEIGHT: 150 LBS | TEMPERATURE: 97.3 F | BODY MASS INDEX: 24.99 KG/M2 | OXYGEN SATURATION: 98 % | HEIGHT: 65 IN | HEART RATE: 74 BPM | DIASTOLIC BLOOD PRESSURE: 79 MMHG

## 2023-11-07 DIAGNOSIS — Z12.11 ENCOUNTER FOR SCREENING COLONOSCOPY: Primary | ICD-10-CM

## 2023-11-07 PROCEDURE — S0285 CNSLT BEFORE SCREEN COLONOSC: HCPCS | Performed by: SURGERY

## 2023-11-07 NOTE — PROGRESS NOTES
[facility] Note    Assessment/Plan:      Diagnosis Orders   1. Encounter for screening colonoscopy      We will plan for colonoscopy. Return for Colonoscopy. Chief Complaint   Patient presents with    New Patient    Colonoscopy     Colonoscopy screening       PCP: Jeremy Porras MD    HPI: Penelope Jeffrey is a 48 y.o. female who presents in consultation for colonoscopy. Her last one was many years ago. It was her abdominal pain and she ended up having a GYN type issues. She says the colonoscopy was normal.  She denies any acute issues. She has alternating diarrhea and constipation chronically. She does take fiber supplementation. There is no family history of colon cancer or inflammatory bowel disease. Past Medical History:   Diagnosis Date    Endometriosis     IBS (irritable bowel syndrome)     Ovarian cyst     Seizure ([address])        Past Surgical History:   Procedure Laterality Date    BREAST SURGERY      CHOLECYSTECTOMY      HYSTERECTOMY (CERVIX STATUS UNKNOWN)      HYSTERECTOMY, VAGINAL         Prior to Admission medications    Medication Sig Start Date End Date Taking?  Authorizing Provider   cyclobenzaprine (FLEXERIL) 10 MG tablet 1/2-1 po q8hrs prn sparingly 10/24/23  Yes Jeremy Porras MD   phenytoin (DILANTIN) 100 MG ER capsule TAKE 3 CAPSULES BY MOUTH DAILY 6/27/23  Yes Irine Po, PA   estradiol (VIVELLE) 0.1 MG/24HR UNW AND MIGUEL 1 PA TO THE SKIN  TWICE A WEEK 7/12/18  Yes Tigist Mackay MD   venlafaxine (EFFEXOR-XR) 75 MG XR capsule Take 1 capsule by mouth daily 6/28/16  Yes Tigist Mackay MD   RESTASIS 0.05 % EMUL  5/19/15  Yes Tigist Mackay MD       No Known Allergies    Social History     Socioeconomic History    Marital status:      Spouse name: None    Number of children: None    Years of education: None    Highest education level: None   Tobacco Use    Smoking status: Never    Smokeless tobacco: Never   Substance and

## 2023-11-08 DIAGNOSIS — Z12.11 COLON CANCER SCREENING: ICD-10-CM

## 2023-11-08 LAB
CONTROL: NORMAL
FECAL BLOOD IMMUNOCHEMICAL TEST: NORMAL

## 2023-11-16 ENCOUNTER — TELEPHONE (OUTPATIENT)
Dept: SURGERY | Age: 53
End: 2023-11-16

## 2023-11-16 ENCOUNTER — PREP FOR PROCEDURE (OUTPATIENT)
Dept: SURGERY | Age: 53
End: 2023-11-16

## 2023-11-16 DIAGNOSIS — Z12.11 COLON CANCER SCREENING: ICD-10-CM

## 2023-11-16 NOTE — TELEPHONE ENCOUNTER
Retadaren Valiente is scheduled for colonoscopy with Dr Lilibeth Villarreal on 04-18-24 at [facility]. Patient needs to be NPO after midnight the night before procedure. All surgery instructions were explained to the patient and a surgery letter was also mailed out. MA informed patient that PAT will also be calling to review pre-op instructions and medications. Patient verbalized understanding.   Electronically signed by Taqueria Jonas MA on 11/16/2023 at 10:14 AM

## 2023-11-16 NOTE — TELEPHONE ENCOUNTER
Prior Authorization Form:      DEMOGRAPHICS:                     Patient Name:  Zofia Rollins  Patient :  1970            Insurance:  Payor: Shiv Borja / Plan: Patti Commander / Product Type: *No Product type* /   Insurance ID Number:    Payer/Plan Subscr  Sex Relation Sub. Ins. ID Effective Group Num   1.  Keyonna ALICEA 1970 Female Self [id number] [facility] [id number]         DIAGNOSIS & PROCEDURE:                       Procedure/Operation: Colonoscopy           CPT Code: 89946    Diagnosis:  Screening    ICD10 Code: Z12.11    Location:  [facility]    Surgeon:  Dr Cici Jeff INFORMATION:                          Date: 24    Time: 10:00 am              Anesthesia:  [facility]                                                       Status:  Outpatient        Special Comments:         Electronically signed by Elva More MA on 2023 at 10:16 AM

## 2023-12-16 PROBLEM — Z12.11 COLON CANCER SCREENING: Status: RESOLVED | Noted: 2023-11-16 | Resolved: 2023-12-16

## 2024-01-09 PROBLEM — Z12.11 COLON CANCER SCREENING: Status: ACTIVE | Noted: 2023-11-16

## 2024-01-10 ENCOUNTER — TELEPHONE (OUTPATIENT)
Dept: SURGERY | Age: 54
End: 2024-01-10

## 2024-01-10 NOTE — TELEPHONE ENCOUNTER
Prior Authorization Form:      DEMOGRAPHICS:                     Patient Name:  Marylu Lopez  Patient :  1970            Insurance:  Payor: ALEXIS / Plan: VIOLETTE ESPINOZA FEDERAL / Product Type: *No Product type* /   Insurance ID Number:    Payer/Plan Subscr  Sex Relation Sub. Ins. ID Effective Group Num   1. BCBS - ANTHEM* MARYLU LOPEZ 1970 Female Self [id number]                                   [address]         DIAGNOSIS & PROCEDURE:                       Procedure/Operation: Colonoscopy           CPT Code: 69370    Diagnosis:  Screening    ICD10 Code: Z12.11    Location:  [facility]    Surgeon:  Dr Levine    SCHEDULING INFORMATION:                          Date: 24    Time: 9:00 am              Anesthesia:  [facility]                                                       Status:  Outpatient        Special Comments:  RS from 24       Electronically signed by Radha Burgess MA on 1/10/2024 at 7:25 AM

## 2024-01-10 NOTE — TELEPHONE ENCOUNTER
MA rescheduled patient's colonoscopy to 05-29-24 at [facility] due to   Dr Levine' schedule.  Electronically signed by Radha Burgess MA on 1/10/2024 at 7:25 AM

## 2024-02-08 PROBLEM — Z12.11 COLON CANCER SCREENING: Status: RESOLVED | Noted: 2023-11-16 | Resolved: 2024-02-08

## 2024-02-26 LAB — MAMMOGRAPHY, EXTERNAL: NORMAL

## 2024-03-14 ENCOUNTER — OFFICE VISIT (OUTPATIENT)
Dept: ORTHOPEDIC SURGERY | Age: 54
End: 2024-03-14

## 2024-03-14 DIAGNOSIS — M25.511 ACUTE PAIN OF RIGHT SHOULDER: Primary | ICD-10-CM

## 2024-03-14 DIAGNOSIS — M75.51 SUBACROMIAL BURSITIS OF RIGHT SHOULDER JOINT: ICD-10-CM

## 2024-03-14 RX ORDER — BUPIVACAINE HYDROCHLORIDE 2.5 MG/ML
2 INJECTION, SOLUTION INFILTRATION; PERINEURAL ONCE
Status: COMPLETED | OUTPATIENT
Start: 2024-03-14 | End: 2024-03-14

## 2024-03-14 RX ORDER — TRIAMCINOLONE ACETONIDE 40 MG/ML
40 INJECTION, SUSPENSION INTRA-ARTICULAR; INTRAMUSCULAR ONCE
Status: COMPLETED | OUTPATIENT
Start: 2024-03-14 | End: 2024-03-14

## 2024-03-14 RX ADMIN — BUPIVACAINE HYDROCHLORIDE 5 MG: 2.5 INJECTION, SOLUTION INFILTRATION; PERINEURAL at 13:09

## 2024-03-14 RX ADMIN — TRIAMCINOLONE ACETONIDE 40 MG: 40 INJECTION, SUSPENSION INTRA-ARTICULAR; INTRAMUSCULAR at 13:10

## 2024-03-14 NOTE — PROGRESS NOTES
the right shoulder show no acute bony abnormalities.  There are no fractures dislocations.  No degenerative changes noted.  These images were independently interpreted myself and discussed with the patient    Radiographic findings reviewed with patient    ASSESSMENT   Right shoulder rotator cuff tendinitis and subacromial bursitis      PLAN  -I explained the pathology and treatment in detail.  I have recommended a subacromial steroid injection today.  I will also give her a supervised home exercise program to help with rotator cuff strength prevent this from coming back.  Typically this problem responds very well to conservative treatment.  She can give us a call if this fails to provide relief otherwise follow-up as needed          Procedure Note:  Right Shoulder steroid injection     The Right shoulder was identified as the injection site. The risk and benefits of a cortisone injection were explained and the patient consented to the injection. Under sterile conditions, the subacromial space was injected from posterior approach with a mixture of 40 mg of Kenalog, 2 cc  0.25% Marcaine without complication. A sterile bandage was applied.         Surya Wesley DO   Orthopaedic Surgery   3/14/24  1:05 PM    Note: This report was completed using computerRuxter voiced recognition software.  Every effort has been made to ensure accuracy; however, inadvertent computerized transcription errors may be present.

## 2024-03-14 NOTE — PATIENT INSTRUCTIONS
Rotator Cuff: Exercises  Introduction  Here are some examples of exercises for you to try. The exercises may be suggested for a condition or for rehabilitation. Start each exercise slowly.Ease off the exercises if you start to have pain.  You will be told when to start these exercises and which ones will work bestfor you.  How to do the exercises  Pendulum swing  If you have pain in your back, do not do this exercise.  Hold on to a table or the back of a chair with your good arm. Then bend forward a little and let your sore arm hang straight down. This exercise does not use the arm muscles. Rather, use your legs and your hips to create movement that makes your arm swing freely.  Use the movement from your hips and legs to guide the slightly swinging arm back and forth like a pendulum (or elephant trunk). Then guide it in circles that start small (about the size of a dinner plate). Make the circles a bit larger each day, as your pain allows.  Do this exercise for 5 minutes, 5 to 7 times each day.  As you have less pain, try bending over a little farther to do this exercise. This will increase the amount of movement at your shoulder.  Posterior stretching exercise  Hold the elbow of your injured arm with your other hand.  Use your hand to pull your injured arm gently up and across your body. You will feel a gentle stretch across the back of your injured shoulder.  Hold for at least 15 to 30 seconds. Then slowly lower your arm.  Repeat 2 to 4 times.  Up-the-back stretch  Your doctor or physical therapist may want you to wait to do this stretch until you have regained most of your range of motion and strength. You can do this stretch in different ways. Hold any of these stretches for at least 15 to 30seconds. Repeat them 2 to 4 times.  Light stretch: Put your hand in your back pocket. Let it rest there to stretch your shoulder.  Moderate stretch: With your other hand, hold your injured arm (palm outward) behind

## 2024-05-03 ENCOUNTER — NURSE ONLY (OUTPATIENT)
Dept: PRIMARY CARE CLINIC | Age: 54
End: 2024-05-03
Payer: COMMERCIAL

## 2024-05-03 DIAGNOSIS — Z23 ENCOUNTER FOR IMMUNIZATION: Primary | ICD-10-CM

## 2024-05-03 PROCEDURE — 90471 IMMUNIZATION ADMIN: CPT | Performed by: FAMILY MEDICINE

## 2024-05-03 PROCEDURE — 90632 HEPA VACCINE ADULT IM: CPT | Performed by: FAMILY MEDICINE

## 2024-05-23 NOTE — PROGRESS NOTES
[facility] PRE-ADMISSION TESTING INSTRUCTIONS    The Preadmission Testing patient is instructed accordingly using the following criteria (check applicable):    ARRIVAL INSTRUCTIONS:  [x] Parking the day of Surgery is located in the Main Entrance lot.  Upon entering the door, make an immediate right to the surgery reception desk    [x] Bring photo ID and insurance card    [] Bring in a copy of Living will or Durable Power of  papers.    [x] Please be sure to arrange for responsible adult to provide transportation to and from the hospital    [x] Please arrange for responsible adult to be with you for the 24 hour period post procedure due to having anesthesia    [x] If you awake am of surgery not feeling well or have temperature >100 please call [phone]    GENERAL INSTRUCTIONS:    [x] Nothing by mouth after midnight, including gum, candy, mints or water    [x] You may brush your teeth, but do not swallow any water    [] Take medications as instructed with 1-2 oz of water    [] Stop herbal supplements and vitamins 5 days prior to procedure    [] Follow preop dosing of blood thinners per physician instructions    [] Take 1/2 dose of evening insulin, but no insulin after midnight    [] No oral diabetic medications after midnight    [] If diabetic and have low blood sugar or feel symptomatic, take 1-2oz apple juice only    [] Bring inhalers day of surgery    [] Bring C-PAP/ Bi-Pap day of surgery    [] Bring urine specimen day of surgery    [x] Shower or bath with soap, lather and rinse well, AM of Surgery, no lotion, powders or creams to surgical site    [] Follow bowel prep as instructed per surgeon    [] No tobacco products within 24 hours of surgery     [] No alcohol or illegal drug use within 24 hours of surgery.    [x] Jewelry, body piercing's, eyeglasses, contact lenses and dentures are not permitted into surgery (bring cases)      [x] Please do not wear any nail polish, make

## 2024-05-29 ENCOUNTER — ANESTHESIA EVENT (OUTPATIENT)
Dept: ENDOSCOPY | Age: 54
End: 2024-05-29
Payer: COMMERCIAL

## 2024-05-29 ENCOUNTER — ANESTHESIA (OUTPATIENT)
Dept: ENDOSCOPY | Age: 54
End: 2024-05-29
Payer: COMMERCIAL

## 2024-05-29 ENCOUNTER — HOSPITAL ENCOUNTER (OUTPATIENT)
Age: 54
Setting detail: OUTPATIENT SURGERY
Discharge: HOME | End: 2024-05-29
Attending: SURGERY | Admitting: SURGERY
Payer: COMMERCIAL

## 2024-05-29 VITALS
DIASTOLIC BLOOD PRESSURE: 54 MMHG | TEMPERATURE: 97.9 F | SYSTOLIC BLOOD PRESSURE: 108 MMHG | WEIGHT: 135 LBS | RESPIRATION RATE: 18 BRPM | HEIGHT: 65 IN | BODY MASS INDEX: 22.49 KG/M2 | OXYGEN SATURATION: 99 % | HEART RATE: 72 BPM

## 2024-05-29 PROCEDURE — 2580000003 HC RX 258: Performed by: NURSE ANESTHETIST, CERTIFIED REGISTERED

## 2024-05-29 PROCEDURE — 3609027000 HC COLONOSCOPY: Performed by: SURGERY

## 2024-05-29 PROCEDURE — 3700000001 HC ADD 15 MINUTES (ANESTHESIA): Performed by: SURGERY

## 2024-05-29 PROCEDURE — 6360000002 HC RX W HCPCS: Performed by: NURSE ANESTHETIST, CERTIFIED REGISTERED

## 2024-05-29 PROCEDURE — 3700000000 HC ANESTHESIA ATTENDED CARE: Performed by: SURGERY

## 2024-05-29 PROCEDURE — 7100000010 HC PHASE II RECOVERY - FIRST 15 MIN: Performed by: SURGERY

## 2024-05-29 PROCEDURE — 2709999900 HC NON-CHARGEABLE SUPPLY: Performed by: SURGERY

## 2024-05-29 PROCEDURE — 45378 DIAGNOSTIC COLONOSCOPY: CPT | Performed by: SURGERY

## 2024-05-29 PROCEDURE — 7100000011 HC PHASE II RECOVERY - ADDTL 15 MIN: Performed by: SURGERY

## 2024-05-29 RX ORDER — SODIUM CHLORIDE 9 MG/ML
INJECTION, SOLUTION INTRAVENOUS CONTINUOUS PRN
Status: DISCONTINUED | OUTPATIENT
Start: 2024-05-29 | End: 2024-05-29 | Stop reason: SDUPTHER

## 2024-05-29 RX ORDER — PROPOFOL 10 MG/ML
INJECTION, EMULSION INTRAVENOUS PRN
Status: DISCONTINUED | OUTPATIENT
Start: 2024-05-29 | End: 2024-05-29 | Stop reason: SDUPTHER

## 2024-05-29 RX ADMIN — SODIUM CHLORIDE: 9 INJECTION, SOLUTION INTRAVENOUS at 08:53

## 2024-05-29 RX ADMIN — PROPOFOL 200 MG: 10 INJECTION, EMULSION INTRAVENOUS at 08:56

## 2024-05-29 ASSESSMENT — PAIN SCALES - GENERAL
PAINLEVEL_OUTOF10: 0

## 2024-05-29 ASSESSMENT — PAIN - FUNCTIONAL ASSESSMENT: PAIN_FUNCTIONAL_ASSESSMENT: 0-10

## 2024-05-29 NOTE — H&P
Lack of Transportation (Non-Medical): No   Housing Stability: Unknown (10/24/2023)     Housing Stability Vital Sign      Unstable Housing in the Last Year: No            Family History         Family History   Problem Relation Age of Onset    Heart Attack Maternal Grandfather      Cancer Maternal Grandmother      Heart Attack Maternal Grandmother      Stroke Maternal Grandmother      Cancer Paternal Grandfather      Stroke Sister              Review of Systems   All other systems reviewed and are negative.                  Objective:  Vitals       Vitals:     11/07/23 1554   BP: 124/79   Pulse: 74   Temp: 97.3 °F (36.3 °C)   SpO2: 98%   Weight: 68 kg (150 lb)   Height: 1.651 m (5' 5\")            Physical Exam  HENT:      Head: Normocephalic and atraumatic.   Eyes:      General:         Right eye: No discharge.         Left eye: No discharge.   Neck:      Trachea: No tracheal deviation.   Cardiovascular:      Rate and Rhythm: Normal rate.   Pulmonary:      Effort: Pulmonary effort is normal. No respiratory distress.   Abdominal:      General: There is no distension.      Palpations: Abdomen is soft.      Tenderness: There is no guarding or rebound.   Skin:     General: Skin is warm and dry.   Neurological:      Mental Status: She is alert and oriented to person, place, and time.            CBC:         Lab Results   Component Value Date/Time     WBC 5.9 10/25/2023 12:00 AM     RBC 4.25 10/25/2023 12:00 AM     HGB 13.9 10/25/2023 12:00 AM     HCT 39.4 10/25/2023 12:00 AM     MCV 92.7 10/25/2023 12:00 AM     MCH 32.7 10/25/2023 12:00 AM     MCHC 35.3 10/25/2023 12:00 AM     RDW 12.1 10/25/2023 12:00 AM      10/25/2023 12:00 AM     MPV 9.9 10/25/2023 12:00 AM      CMP:          Lab Results   Component Value Date/Time      10/25/2023 12:00 AM     K 4.2 10/25/2023 12:00 AM      10/25/2023 12:00 AM     CO2 29 10/25/2023 12:00 AM     BUN 9 10/25/2023 12:00 AM     CREATININE 0.61 10/25/2023 12:00 AM

## 2024-05-29 NOTE — ANESTHESIA POSTPROCEDURE EVALUATION
Department of Anesthesiology  Postprocedure Note    Patient: Luly Lopez  MRN: [id number]  YOB: 1970  Date of evaluation: 5/29/2024    Procedure Summary       Date: 05/29/24 Room / Location: [address]    Anesthesia Start: 0853 Anesthesia Stop: 0914    Procedure: COLORECTAL CANCER SCREENING, NOT HIGH RISK Diagnosis:       Colon cancer screening      (Colon cancer screening [Z12.11])    Surgeons: Benjamin Levine MD Responsible Provider: Brenton Grant MD    Anesthesia Type: MAC ASA Status: 2            Anesthesia Type: No value filed.    Nathanael Phase I: Nathanael Score: 10    Nathanael Phase II:      Anesthesia Post Evaluation    Patient location during evaluation: bedside  Patient participation: complete - patient participated  Level of consciousness: awake  Airway patency: patent  Nausea & Vomiting: no nausea and no vomiting  Cardiovascular status: blood pressure returned to baseline  Respiratory status: acceptable  Hydration status: euvolemic  Pain management: adequate        No notable events documented.

## 2024-05-29 NOTE — ANESTHESIA PRE PROCEDURE
Department of Anesthesiology  Preprocedure Note       Name:  Luly ALICEA Young   Age:  53 y.o.  :  1970                                          MRN:  [id number]         Date:  2024      Surgeon: Surgeon(s):  Benjamin Levine MD    Procedure: Procedure(s):  COLORECTAL CANCER SCREENING, NOT HIGH RISK    Medications prior to admission:   Prior to Admission medications    Medication Sig Start Date End Date Taking? Authorizing Provider   cyclobenzaprine (FLEXERIL) 10 MG tablet 1/2-1 po q8hrs prn sparingly 10/24/23   Peter Samuels MD   phenytoin (DILANTIN) 100 MG ER capsule TAKE 3 CAPSULES BY MOUTH DAILY 23   Ramona Elliott PA   estradiol (VIVELLE) 0.1 MG/24HR UNW AND MIGUEL 1 PA TO THE SKIN  TWICE A WEEK 18   Tigist Mackay MD   venlafaxine (EFFEXOR-XR) 75 MG XR capsule Take 1 capsule by mouth daily 16   Tigist Mackay MD   RESTASIS 0.05 % EMUL  5/19/15   Tigist Mackay MD       Current medications:    No current facility-administered medications for this encounter.       Allergies:  No Known Allergies    Problem List:    Patient Active Problem List   Diagnosis Code    Seizure disorder (HCC) G40.909    Palpitations R00.2       Past Medical History:        Diagnosis Date    Endometriosis     IBS (irritable bowel syndrome)     Ovarian cyst     Seizure (HCC)        Past Surgical History:        Procedure Laterality Date    BREAST SURGERY      CHOLECYSTECTOMY      COLONOSCOPY      HYSTERECTOMY (CERVIX STATUS UNKNOWN)      HYSTERECTOMY, VAGINAL         Social History:    Social History     Tobacco Use    Smoking status: Never    Smokeless tobacco: Never   Substance Use Topics    Alcohol use: No                                Counseling given: Not Answered      Vital Signs (Current):   Vitals:    24 1240   Weight: 61.2 kg (135 lb)   Height: 1.651 m (5' 5\")                                              BP Readings from Last 3 Encounters:   23 124/79   10/24/23 102/68

## 2024-07-16 DIAGNOSIS — G40.909 SEIZURE DISORDER (HCC): ICD-10-CM

## 2024-07-16 RX ORDER — PHENYTOIN SODIUM 100 MG/1
300 CAPSULE, EXTENDED RELEASE ORAL DAILY
Qty: 270 CAPSULE | Refills: 3 | Status: SHIPPED | OUTPATIENT
Start: 2024-07-16

## 2024-08-16 ENCOUNTER — OFFICE VISIT (OUTPATIENT)
Dept: PRIMARY CARE CLINIC | Age: 54
End: 2024-08-16
Payer: COMMERCIAL

## 2024-08-16 VITALS
SYSTOLIC BLOOD PRESSURE: 116 MMHG | OXYGEN SATURATION: 98 % | TEMPERATURE: 98 F | DIASTOLIC BLOOD PRESSURE: 60 MMHG | HEART RATE: 63 BPM | BODY MASS INDEX: 22.13 KG/M2 | WEIGHT: 133 LBS

## 2024-08-16 DIAGNOSIS — B96.89 ACUTE BACTERIAL SINUSITIS: Primary | ICD-10-CM

## 2024-08-16 DIAGNOSIS — G40.909 SEIZURE DISORDER (HCC): ICD-10-CM

## 2024-08-16 DIAGNOSIS — J01.90 ACUTE BACTERIAL SINUSITIS: Primary | ICD-10-CM

## 2024-08-16 DIAGNOSIS — J30.9 ALLERGIC RHINITIS, UNSPECIFIED SEASONALITY, UNSPECIFIED TRIGGER: ICD-10-CM

## 2024-08-16 PROCEDURE — 99214 OFFICE O/P EST MOD 30 MIN: CPT | Performed by: FAMILY MEDICINE

## 2024-08-16 RX ORDER — CEFDINIR 300 MG/1
300 CAPSULE ORAL 2 TIMES DAILY
Qty: 20 CAPSULE | Refills: 0 | Status: SHIPPED | OUTPATIENT
Start: 2024-08-16 | End: 2024-08-26

## 2024-08-16 ASSESSMENT — PATIENT HEALTH QUESTIONNAIRE - PHQ9
SUM OF ALL RESPONSES TO PHQ QUESTIONS 1-9: 0
SUM OF ALL RESPONSES TO PHQ9 QUESTIONS 1 & 2: 0
SUM OF ALL RESPONSES TO PHQ QUESTIONS 1-9: 0
SUM OF ALL RESPONSES TO PHQ QUESTIONS 1-9: 0
2. FEELING DOWN, DEPRESSED OR HOPELESS: NOT AT ALL
1. LITTLE INTEREST OR PLEASURE IN DOING THINGS: NOT AT ALL
SUM OF ALL RESPONSES TO PHQ QUESTIONS 1-9: 0

## 2024-08-16 NOTE — PROGRESS NOTES
symptoms steadily improve/resolve, and medical conditions follow the expected course, FU as below, sooner PRN.    Return in about 2 weeks (around 8/30/2024), or if symptoms worsen or fail to improve, for And yearly physicals.        Signs and symptoms to watch for discussed, serious signs and symptoms reviewed.  ER if any.     Peter Samuels MD    Patients are advised to check with insurance company to ensure coverage and to fully understand benefits and cost prior to any testing. This note was created with the assistance of voice recognition software.  Document was reviewed however may contain grammatical errors.

## 2024-08-26 ENCOUNTER — OFFICE VISIT (OUTPATIENT)
Dept: PRIMARY CARE CLINIC | Age: 54
End: 2024-08-26
Payer: COMMERCIAL

## 2024-08-26 VITALS
TEMPERATURE: 98 F | OXYGEN SATURATION: 98 % | DIASTOLIC BLOOD PRESSURE: 62 MMHG | BODY MASS INDEX: 22.16 KG/M2 | SYSTOLIC BLOOD PRESSURE: 114 MMHG | WEIGHT: 133 LBS | HEART RATE: 90 BPM | HEIGHT: 65 IN

## 2024-08-26 DIAGNOSIS — G40.909 SEIZURE DISORDER (HCC): ICD-10-CM

## 2024-08-26 DIAGNOSIS — J32.9 CHRONIC SINUSITIS, UNSPECIFIED LOCATION: Primary | ICD-10-CM

## 2024-08-26 DIAGNOSIS — Z00.00 HEALTH MAINTENANCE EXAMINATION: ICD-10-CM

## 2024-08-26 DIAGNOSIS — R53.83 OTHER FATIGUE: ICD-10-CM

## 2024-08-26 DIAGNOSIS — J30.9 ALLERGIC RHINITIS, UNSPECIFIED SEASONALITY, UNSPECIFIED TRIGGER: ICD-10-CM

## 2024-08-26 PROCEDURE — 99214 OFFICE O/P EST MOD 30 MIN: CPT | Performed by: FAMILY MEDICINE

## 2024-08-26 RX ORDER — DOXYCYCLINE 100 MG/1
100 CAPSULE ORAL 2 TIMES DAILY
Qty: 20 CAPSULE | Refills: 0 | Status: SHIPPED | OUTPATIENT
Start: 2024-08-26 | End: 2024-09-05

## 2024-08-26 RX ORDER — MONTELUKAST SODIUM 10 MG/1
10 TABLET ORAL DAILY
Qty: 30 TABLET | Refills: 1 | Status: SHIPPED
Start: 2024-08-26 | End: 2024-08-27 | Stop reason: SDUPTHER

## 2024-08-26 NOTE — PROGRESS NOTES
Luly Lopez : 1970 Sex: female  Age: 53 y.o.    Chief Complaint   Patient presents with    URI     Follow up; completed cefdinir        HPI:     Presents today for follow-up, symptoms essentially unchanged.  Gets some minimal relief with Flonase, Allegra made her tired as do all antihistamine she states.  Continued thick postnasal drainage without significant sinus pain pressure fever chills but does feel fatigued.  No cough chest pain abdominal pain nausea vomiting.  It has been about 6 weeks, can pinpoint the day she got sick, had a flight to [place] and then [place].  [place] the same in all locations.  Now status post Z-Piero Augmentin and cefdinir.      Up-to-date on mammogram, had , we will obtain copy.        Review of Systems  ROS:  Const: Denies chills, fever, malaise and sweats.  Eyes: Denies discharge, pain, redness and visual disturbance.  ENMT: As above   CV: Denies chest discomfort, pain; diaphoresis, dizziness, edema, lightheadedness, orthopnea,  palpitations, syncope and near syncopal episode or any exertional symptoms  Resp: Denies cough, hemoptysis, pleuritic pain, SOB, sputum production and wheezing.  GI: Denies abdominal pain, change in bowel habits, hematochezia, melena, nausea and vomiting.  : Denies urinary symptoms including dysuria , urgency, frequency or hematuria.  Musculo: Denies musculoskeletal symptoms.   Skin: Denies bruising and rash.  Neuro: Denies headache, numbness, stiff neck, tingling and focal weakness slurred speech or facial  droop  Hema/Lymph: Denies bleeding/bruising tendency and enlarged lymph nodes.        Most Recent Labs  CBC  Lab Results   Component Value Date/Time    WBC 5.9 10/25/2023 12:00 AM    WBC 4.2 2023 12:00 AM    WBC 4.5 2021 07:38 AM    RBC 4.25 10/25/2023 12:00 AM    RBC 4.30 2023 12:00 AM    RBC 4.43 2021 07:38 AM    HGB 13.9 10/25/2023 12:00 AM    HGB 14.3 2023 12:00 AM    HGB 13.9 2021 07:38 AM    HCT  Allergic rhinitis, unspecified seasonality, unspecified trigger  montelukast (SINGULAIR) 10 MG tablet      3. Seizure disorder (HCC)        4. Health maintenance examination  Urinalysis    Lipid Panel    TSH    Comprehensive Metabolic Panel    CBC with Auto Differential    Urinalysis      5. Other fatigue  Jeanmarie Barr Virus (EBV) Antibody Panel I        Chronic sinusitis  Counseled.  Differential reviewed.  Status post Z-Piero and Augmentin, and cefdinir.  Role of antibiotics reviewed with benefits reviewed and she is agreeable to doxycycline with standard precautions including pseudotumor/photosensitivity/C. difficile.  Would not be interested in Levaquin with precautions related to this.  Counseled on probiotics.  Plain Mucinex as directed with precautions.  Potential interactions reviewed.  Proper hydration reviewed.  Coolmist vaporizer as directed.  Monitor precaution reviewed.  Counseled on nasal saline.  Nasal steroid treat allergies as below.  Agreeable to ENT consult and CT sinuses with precautions.  Option of allergy/immunology consult also reviewed        Allergic rhinitis  Counseled extensively. Differential reviewed, including serious etiologies.  May use antihistamine if thin drainage, Allegra 180 mg daily makes tired.  We discussed alternatives of Xyzal/Claritin/Zyrtec (bedtime) daily as directed as needed +/- Singulair with standard precautions including black box warning depression (take opposite time of day antihistamine) and she would like to try this.  Continue nasal steroid i.e. Flonase/Nasacort 2 sprays his nostril daily as needed may reduce 1 sprays nostril daily as needed maintenance      Seizure disorder  Counseled extensively. Differential reviewed, including serious etiologies. Follows with Lulu, last seizure many yrs ago, never while on meds. R/b reviewed. Doing well. Counseled.  Seizure precautions    Health maintenance examination  Health maintenance issues discussed at length

## 2024-08-27 ENCOUNTER — HOSPITAL ENCOUNTER (OUTPATIENT)
Age: 54
Discharge: HOME | End: 2024-08-27
Payer: COMMERCIAL

## 2024-08-27 DIAGNOSIS — Z00.00 HEALTH MAINTENANCE EXAMINATION: ICD-10-CM

## 2024-08-27 DIAGNOSIS — R53.83 OTHER FATIGUE: ICD-10-CM

## 2024-08-27 DIAGNOSIS — J30.9 ALLERGIC RHINITIS, UNSPECIFIED SEASONALITY, UNSPECIFIED TRIGGER: ICD-10-CM

## 2024-08-27 LAB
ALBUMIN SERPL-MCNC: 4.3 G/DL (ref 3.5–5.2)
ALP SERPL-CCNC: 115 U/L (ref 35–104)
ALT SERPL-CCNC: 11 U/L (ref 0–32)
ANION GAP SERPL CALCULATED.3IONS-SCNC: 8 MMOL/L (ref 7–16)
AST SERPL-CCNC: 19 U/L (ref 0–31)
BASOPHILS # BLD: 0.04 K/UL (ref 0–0.2)
BASOPHILS NFR BLD: 1 % (ref 0–2)
BILIRUB SERPL-MCNC: 0.3 MG/DL (ref 0–1.2)
BILIRUB UR QL STRIP: NEGATIVE
BUN SERPL-MCNC: 9 MG/DL (ref 6–20)
CALCIUM SERPL-MCNC: 9.3 MG/DL (ref 8.6–10.2)
CHLORIDE SERPL-SCNC: 101 MMOL/L (ref 98–107)
CHOLEST SERPL-MCNC: 192 MG/DL
CLARITY UR: CLEAR
CO2 SERPL-SCNC: 30 MMOL/L (ref 22–29)
COLOR UR: YELLOW
COMMENT: NORMAL
CREAT SERPL-MCNC: 0.8 MG/DL (ref 0.5–1)
EOSINOPHIL # BLD: 0.27 K/UL (ref 0.05–0.5)
EOSINOPHILS RELATIVE PERCENT: 5 % (ref 0–6)
ERYTHROCYTE [DISTWIDTH] IN BLOOD BY AUTOMATED COUNT: 12.2 % (ref 11.5–15)
GFR, ESTIMATED: >90 ML/MIN/1.73M2
GLUCOSE SERPL-MCNC: 94 MG/DL (ref 74–99)
GLUCOSE UR STRIP-MCNC: NEGATIVE MG/DL
HCT VFR BLD AUTO: 41.1 % (ref 34–48)
HDLC SERPL-MCNC: 100 MG/DL
HGB BLD-MCNC: 14.1 G/DL (ref 11.5–15.5)
HGB UR QL STRIP.AUTO: NEGATIVE
IMM GRANULOCYTES # BLD AUTO: <0.03 K/UL (ref 0–0.58)
IMM GRANULOCYTES NFR BLD: 0 % (ref 0–5)
KETONES UR STRIP-MCNC: NEGATIVE MG/DL
LDLC SERPL CALC-MCNC: 73 MG/DL
LEUKOCYTE ESTERASE UR QL STRIP: NEGATIVE
LYMPHOCYTES NFR BLD: 1.61 K/UL (ref 1.5–4)
LYMPHOCYTES RELATIVE PERCENT: 32 % (ref 20–42)
MCH RBC QN AUTO: 32.6 PG (ref 26–35)
MCHC RBC AUTO-ENTMCNC: 34.3 G/DL (ref 32–34.5)
MCV RBC AUTO: 94.9 FL (ref 80–99.9)
MONOCYTES NFR BLD: 0.43 K/UL (ref 0.1–0.95)
MONOCYTES NFR BLD: 9 % (ref 2–12)
NEUTROPHILS NFR BLD: 53 % (ref 43–80)
NEUTS SEG NFR BLD: 2.7 K/UL (ref 1.8–7.3)
NITRITE UR QL STRIP: NEGATIVE
PH UR STRIP: 8 [PH] (ref 5–9)
PLATELET # BLD AUTO: 239 K/UL (ref 130–450)
PMV BLD AUTO: 9.4 FL (ref 7–12)
POTASSIUM SERPL-SCNC: 4.3 MMOL/L (ref 3.5–5)
PROT SERPL-MCNC: 6.6 G/DL (ref 6.4–8.3)
PROT UR STRIP-MCNC: NEGATIVE MG/DL
RBC # BLD AUTO: 4.33 M/UL (ref 3.5–5.5)
SODIUM SERPL-SCNC: 139 MMOL/L (ref 132–146)
SP GR UR STRIP: 1.01 (ref 1–1.03)
TRIGL SERPL-MCNC: 97 MG/DL
TSH SERPL DL<=0.05 MIU/L-ACNC: 3.24 UIU/ML (ref 0.27–4.2)
UROBILINOGEN UR STRIP-ACNC: 0.2 EU/DL (ref 0–1)
VLDLC SERPL CALC-MCNC: 19 MG/DL
WBC OTHER # BLD: 5.1 K/UL (ref 4.5–11.5)

## 2024-08-27 PROCEDURE — 86665 EPSTEIN-BARR CAPSID VCA: CPT

## 2024-08-27 PROCEDURE — 84443 ASSAY THYROID STIM HORMONE: CPT

## 2024-08-27 PROCEDURE — 86663 EPSTEIN-BARR ANTIBODY: CPT

## 2024-08-27 PROCEDURE — 86664 EPSTEIN-BARR NUCLEAR ANTIGEN: CPT

## 2024-08-27 PROCEDURE — 80061 LIPID PANEL: CPT

## 2024-08-27 PROCEDURE — 81003 URINALYSIS AUTO W/O SCOPE: CPT

## 2024-08-27 PROCEDURE — 36415 COLL VENOUS BLD VENIPUNCTURE: CPT

## 2024-08-27 PROCEDURE — 85025 COMPLETE CBC W/AUTO DIFF WBC: CPT

## 2024-08-27 PROCEDURE — 80053 COMPREHEN METABOLIC PANEL: CPT

## 2024-08-27 RX ORDER — MONTELUKAST SODIUM 10 MG/1
10 TABLET ORAL DAILY
Qty: 90 TABLET | Refills: 1 | Status: SHIPPED | OUTPATIENT
Start: 2024-08-27

## 2024-08-29 LAB
EBV EA-D IGG SER-ACNC: 138 U/ML
EBV INTERPRETATION: ABNORMAL
EBV NA IGG SER IA-ACNC: 68 U/ML
EBV VCA IGG SER-ACNC: 950 U/ML
EBV VCA IGM SER-ACNC: 77 U/ML

## 2024-08-29 NOTE — RESULT ENCOUNTER NOTE
Based on titers possibly getting over a recent monoinfection.  Likely beyond the first 3 to 6 weeks.  Standard precautions

## 2024-08-30 ENCOUNTER — HOSPITAL ENCOUNTER (OUTPATIENT)
Dept: CT IMAGING | Age: 54
Discharge: HOME | End: 2024-08-30
Attending: FAMILY MEDICINE
Payer: COMMERCIAL

## 2024-08-30 DIAGNOSIS — J32.9 CHRONIC SINUSITIS, UNSPECIFIED LOCATION: ICD-10-CM

## 2024-08-30 PROCEDURE — 70486 CT MAXILLOFACIAL W/O DYE: CPT

## 2024-08-30 NOTE — RESULT ENCOUNTER NOTE
Mild sinus thickening with possible mucous retention cyst on CAT scan, continue per OV plan, CC ENT and see ENT as referred

## 2025-02-27 LAB — MAMMOGRAPHY, EXTERNAL: NORMAL

## 2025-06-04 ENCOUNTER — OFFICE VISIT (OUTPATIENT)
Dept: PRIMARY CARE CLINIC | Age: 55
End: 2025-06-04

## 2025-06-04 VITALS
BODY MASS INDEX: 21.66 KG/M2 | HEIGHT: 65 IN | WEIGHT: 130 LBS | SYSTOLIC BLOOD PRESSURE: 108 MMHG | OXYGEN SATURATION: 97 % | TEMPERATURE: 98 F | HEART RATE: 98 BPM | DIASTOLIC BLOOD PRESSURE: 64 MMHG

## 2025-06-04 DIAGNOSIS — J01.90 ACUTE BACTERIAL SINUSITIS: Primary | ICD-10-CM

## 2025-06-04 DIAGNOSIS — B96.89 ACUTE BACTERIAL SINUSITIS: Primary | ICD-10-CM

## 2025-06-04 DIAGNOSIS — G40.909 SEIZURE DISORDER (HCC): ICD-10-CM

## 2025-06-04 DIAGNOSIS — J06.9 ACUTE UPPER RESPIRATORY INFECTION: ICD-10-CM

## 2025-06-04 DIAGNOSIS — Z00.00 HEALTH MAINTENANCE EXAMINATION: ICD-10-CM

## 2025-06-04 LAB
INFLUENZA A ANTIBODY: NEGATIVE
INFLUENZA B ANTIBODY: NEGATIVE
Lab: NORMAL
PERFORMING INSTRUMENT: NORMAL
QC PASS/FAIL: NORMAL
SARS-COV-2, POC: NORMAL

## 2025-06-04 RX ORDER — CEFDINIR 300 MG/1
300 CAPSULE ORAL 2 TIMES DAILY
Qty: 20 CAPSULE | Refills: 0 | Status: SHIPPED | OUTPATIENT
Start: 2025-06-04 | End: 2025-06-14

## 2025-06-04 SDOH — ECONOMIC STABILITY: FOOD INSECURITY: WITHIN THE PAST 12 MONTHS, THE FOOD YOU BOUGHT JUST DIDN'T LAST AND YOU DIDN'T HAVE MONEY TO GET MORE.: NEVER TRUE

## 2025-06-04 SDOH — ECONOMIC STABILITY: FOOD INSECURITY: WITHIN THE PAST 12 MONTHS, YOU WORRIED THAT YOUR FOOD WOULD RUN OUT BEFORE YOU GOT MONEY TO BUY MORE.: NEVER TRUE

## 2025-06-04 ASSESSMENT — PATIENT HEALTH QUESTIONNAIRE - PHQ9
2. FEELING DOWN, DEPRESSED OR HOPELESS: NOT AT ALL
SUM OF ALL RESPONSES TO PHQ QUESTIONS 1-9: 0
1. LITTLE INTEREST OR PLEASURE IN DOING THINGS: NOT AT ALL
SUM OF ALL RESPONSES TO PHQ QUESTIONS 1-9: 0

## 2025-06-04 NOTE — PROGRESS NOTES
Luly Lopez : 1970 Sex: female  Age: 54 y.o.    Chief Complaint   Patient presents with    Sinusitis     X 2 weeks        HPI:     Presents with 2 weeks of sinus pressure thick rhinorrhea postnasal drainage no headache dizziness fever chills cough chest pain shortness of breath wheezing or abdominal symptoms.  Follows with Suhail Magdaleno, seizures been well-controlled.  Has been trying Flonase.  Initially Makenzie ge, counseled        Up-to-date on mammogram, around 2025, we will obtain copy.        Review of Systems  ROS:  Const: Denies chills, fever, malaise and sweats.  Eyes: Denies discharge, pain, redness and visual disturbance.  ENMT: As above   CV: Denies chest discomfort, pain; diaphoresis, dizziness, edema, lightheadedness, orthopnea,  palpitations, syncope and near syncopal episode or any exertional symptoms  Resp: Denies cough, hemoptysis, pleuritic pain, SOB, sputum production and wheezing.  GI: Denies abdominal pain, change in bowel habits, hematochezia, melena, nausea and vomiting.  : Denies urinary symptoms including dysuria , urgency, frequency or hematuria.  Musculo: Denies musculoskeletal symptoms.   Skin: Denies bruising and rash.  Neuro: Denies headache, numbness, stiff neck, tingling and focal weakness slurred speech or facial  droop  Hema/Lymph: Denies bleeding/bruising tendency and enlarged lymph nodes.        Most Recent Labs  CBC  Lab Results   Component Value Date/Time    WBC 5.1 2024 07:44 AM    WBC 5.9 10/25/2023 12:00 AM    WBC 4.2 2023 12:00 AM    RBC 4.33 2024 07:44 AM    RBC 4.25 10/25/2023 12:00 AM    RBC 4.30 2023 12:00 AM    HGB 14.1 2024 07:44 AM    HGB 13.9 10/25/2023 12:00 AM    HGB 14.3 2023 12:00 AM    HCT 41.1 2024 07:44 AM    HCT 39.4 10/25/2023 12:00 AM    HCT 40.2 2023 12:00 AM    MCV 94.9 2024 07:44 AM    MCV 92.7 10/25/2023 12:00 AM    MCV 93.5 2023 12:00 AM     2024 07:44 AM

## 2025-06-06 ENCOUNTER — OFFICE VISIT (OUTPATIENT)
Dept: PRIMARY CARE CLINIC | Age: 55
End: 2025-06-06

## 2025-06-06 VITALS
BODY MASS INDEX: 21.66 KG/M2 | SYSTOLIC BLOOD PRESSURE: 120 MMHG | DIASTOLIC BLOOD PRESSURE: 62 MMHG | HEART RATE: 98 BPM | HEIGHT: 65 IN | OXYGEN SATURATION: 97 % | WEIGHT: 130 LBS | TEMPERATURE: 98 F

## 2025-06-06 DIAGNOSIS — B96.89 ACUTE BACTERIAL SINUSITIS: Primary | ICD-10-CM

## 2025-06-06 DIAGNOSIS — J40 BRONCHITIS: ICD-10-CM

## 2025-06-06 DIAGNOSIS — J01.90 ACUTE BACTERIAL SINUSITIS: Primary | ICD-10-CM

## 2025-06-06 RX ORDER — AZITHROMYCIN 250 MG/1
TABLET, FILM COATED ORAL
Qty: 6 TABLET | Refills: 0 | Status: SHIPPED | OUTPATIENT
Start: 2025-06-06

## 2025-06-06 RX ORDER — ALBUTEROL SULFATE 90 UG/1
2 INHALANT RESPIRATORY (INHALATION) EVERY 4 HOURS PRN
Qty: 18 G | Refills: 0 | Status: SHIPPED | OUTPATIENT
Start: 2025-06-06

## 2025-06-06 RX ORDER — PREDNISONE 10 MG/1
TABLET ORAL
Qty: 12 TABLET | Refills: 0 | Status: SHIPPED | OUTPATIENT
Start: 2025-06-06 | End: 2025-06-12

## 2025-06-06 NOTE — PROGRESS NOTES
Luly Lopez : 1970 Sex: female  Age: 54 y.o.    Chief Complaint   Patient presents with    Sinusitis     Follow up feeling worse    Cough     Deep cough       HPI  HPI:      Presents today as below.  She said she knows its only been 2 days, probably would not, if it was earlier in the week but because the weekend is upon us wanted to get checked again.  No significant sinus pain pressure fever chills or shortness of breath but has developed a worsened cough dry paroxysmal at times with wheezing no chest pain abdominal pain nausea vomiting or change in bowels.  No other complaints or concerns.    ROS:  As above      Current Outpatient Medications:     albuterol sulfate HFA (VENTOLIN HFA) 108 (90 Base) MCG/ACT inhaler, Inhale 2 puffs into the lungs every 4 hours as needed for Wheezing or Shortness of Breath (or COUGH) ; disp with spacer, Disp: 18 g, Rfl: 0    predniSONE (DELTASONE) 10 MG tablet, Take 3 tablets by mouth daily for 2 days, THEN 2 tablets daily for 2 days, THEN 1 tablet daily for 2 days., Disp: 12 tablet, Rfl: 0    azithromycin (ZITHROMAX) 250 MG tablet, 2 po qd  day 1 then 1 po qd  days 2 - 5, Disp: 6 tablet, Rfl: 0    Fluticasone Propionate (FLONASE NA), by Nasal route, Disp: , Rfl:     cefdinir (OMNICEF) 300 MG capsule, Take 1 capsule by mouth 2 times daily for 10 days, Disp: 20 capsule, Rfl: 0    phenytoin (DILANTIN) 100 MG ER capsule, Take 3 capsules by mouth daily, Disp: 270 capsule, Rfl: 3    cyclobenzaprine (FLEXERIL) 10 MG tablet, 1/2-1 po q8hrs prn sparingly, Disp: 30 tablet, Rfl: 1    estradiol (VIVELLE) 0.1 MG/24HR, UNW AND MIGUEL 1 PA TO THE SKIN  TWICE A WEEK, Disp: , Rfl: 0    venlafaxine (EFFEXOR-XR) 75 MG XR capsule, Take 1 capsule by mouth daily, Disp: , Rfl:     RESTASIS 0.05 % EMUL,   , Disp: , Rfl:   No Known Allergies    Past Medical History:   Diagnosis Date    Endometriosis     IBS (irritable bowel syndrome)     Ovarian cyst     Seizure (HCC)      Past Surgical History:

## 2025-07-05 DIAGNOSIS — G40.909 SEIZURE DISORDER (HCC): ICD-10-CM

## 2025-07-07 NOTE — TELEPHONE ENCOUNTER
Last Appointment:  9/23/2024  Future Appointments   Date Time Provider Department Center   7/18/2025  9:00 AM Peter Samuels MD [facility]   9/24/2025  8:00 AM Suhail Magdaleno APRN - [facility] -      Plan:      Continue AED for now     RTO 1 year     Call with new difficulties     PK [place]  8:44 AM  9/23/2024

## 2025-07-09 RX ORDER — PHENYTOIN SODIUM 100 MG/1
300 CAPSULE, EXTENDED RELEASE ORAL DAILY
Qty: 270 CAPSULE | Refills: 3 | Status: SHIPPED | OUTPATIENT
Start: 2025-07-09

## 2025-07-18 ENCOUNTER — OFFICE VISIT (OUTPATIENT)
Dept: PRIMARY CARE CLINIC | Age: 55
End: 2025-07-18
Payer: COMMERCIAL

## 2025-07-18 ENCOUNTER — RESULTS FOLLOW-UP (OUTPATIENT)
Dept: PRIMARY CARE CLINIC | Age: 55
End: 2025-07-18

## 2025-07-18 VITALS
OXYGEN SATURATION: 96 % | HEART RATE: 80 BPM | BODY MASS INDEX: 21.47 KG/M2 | WEIGHT: 129 LBS | DIASTOLIC BLOOD PRESSURE: 60 MMHG | TEMPERATURE: 97.6 F | SYSTOLIC BLOOD PRESSURE: 116 MMHG

## 2025-07-18 DIAGNOSIS — G40.909 SEIZURE DISORDER (HCC): ICD-10-CM

## 2025-07-18 DIAGNOSIS — Z00.00 HEALTH MAINTENANCE EXAMINATION: Primary | ICD-10-CM

## 2025-07-18 DIAGNOSIS — Z23 ENCOUNTER FOR IMMUNIZATION: ICD-10-CM

## 2025-07-18 DIAGNOSIS — D72.819 LEUKOPENIA, UNSPECIFIED TYPE: Primary | ICD-10-CM

## 2025-07-18 DIAGNOSIS — Z00.00 HEALTH MAINTENANCE EXAMINATION: ICD-10-CM

## 2025-07-18 PROBLEM — R00.2 PALPITATIONS: Status: RESOLVED | Noted: 2020-03-05 | Resolved: 2025-07-18

## 2025-07-18 LAB
ALBUMIN: 4.4 G/DL (ref 3.5–5.2)
ALP BLD-CCNC: 105 U/L (ref 35–104)
ALT SERPL-CCNC: 12 U/L (ref 0–35)
ANION GAP SERPL CALCULATED.3IONS-SCNC: 11 MMOL/L (ref 7–16)
AST SERPL-CCNC: 27 U/L (ref 0–35)
BASOPHILS ABSOLUTE: 0.02 K/UL (ref 0–0.2)
BASOPHILS RELATIVE PERCENT: 1 % (ref 0–2)
BILIRUB SERPL-MCNC: 0.4 MG/DL (ref 0–1.2)
BILIRUBIN, URINE: NEGATIVE
BUN BLDV-MCNC: 12 MG/DL (ref 6–20)
CALCIUM SERPL-MCNC: 9 MG/DL (ref 8.6–10)
CHLORIDE BLD-SCNC: 99 MMOL/L (ref 98–107)
CHOLESTEROL, TOTAL: 190 MG/DL
CO2: 27 MMOL/L (ref 22–29)
COLOR, UA: YELLOW
COMMENT: ABNORMAL
CREAT SERPL-MCNC: 0.8 MG/DL (ref 0.5–1)
EOSINOPHILS ABSOLUTE: 0.21 K/UL (ref 0.05–0.5)
EOSINOPHILS RELATIVE PERCENT: 5 % (ref 0–6)
GFR, ESTIMATED: 89 ML/MIN/1.73M2
GLUCOSE BLD-MCNC: 89 MG/DL (ref 74–99)
GLUCOSE URINE: NEGATIVE MG/DL
HCT VFR BLD CALC: 40.5 % (ref 34–48)
HDLC SERPL-MCNC: 125 MG/DL
HEMOGLOBIN: 14.2 G/DL (ref 11.5–15.5)
IMMATURE GRANULOCYTES %: 0 % (ref 0–5)
IMMATURE GRANULOCYTES ABSOLUTE: <0.03 K/UL (ref 0–0.58)
KETONES, URINE: NEGATIVE MG/DL
LDL CHOLESTEROL: 48 MG/DL
LEUKOCYTE ESTERASE, URINE: NEGATIVE
LYMPHOCYTES ABSOLUTE: 1.29 K/UL (ref 1.5–4)
LYMPHOCYTES RELATIVE PERCENT: 33 % (ref 20–42)
MCH RBC QN AUTO: 34 PG (ref 26–35)
MCHC RBC AUTO-ENTMCNC: 35.1 G/DL (ref 32–34.5)
MCV RBC AUTO: 96.9 FL (ref 80–99.9)
MONOCYTES ABSOLUTE: 0.34 K/UL (ref 0.1–0.95)
MONOCYTES RELATIVE PERCENT: 9 % (ref 2–12)
NEUTROPHILS ABSOLUTE: 2.09 K/UL (ref 1.8–7.3)
NEUTROPHILS RELATIVE PERCENT: 53 % (ref 43–80)
NITRITE, URINE: NEGATIVE
PDW BLD-RTO: 12.4 % (ref 11.5–15)
PH, URINE: 7.5 (ref 5–8)
PLATELET # BLD: 230 K/UL (ref 130–450)
PMV BLD AUTO: 10 FL (ref 7–12)
POTASSIUM SERPL-SCNC: 4.3 MMOL/L (ref 3.5–5.1)
PROTEIN UA: NEGATIVE MG/DL
RBC # BLD: 4.18 M/UL (ref 3.5–5.5)
SODIUM BLD-SCNC: 137 MMOL/L (ref 136–145)
SPECIFIC GRAVITY UA: <1.005 (ref 1–1.03)
TOTAL PROTEIN: 6.6 G/DL (ref 6.4–8.3)
TRIGL SERPL-MCNC: 83 MG/DL
TSH SERPL DL<=0.05 MIU/L-ACNC: 2.18 UIU/ML (ref 0.27–4.2)
TURBIDITY: CLEAR
URINE HGB: NEGATIVE
UROBILINOGEN, URINE: 1 EU/DL (ref 0–1)
VLDLC SERPL CALC-MCNC: 17 MG/DL
WBC # BLD: 4 K/UL (ref 4.5–11.5)

## 2025-07-18 PROCEDURE — 99396 PREV VISIT EST AGE 40-64: CPT | Performed by: FAMILY MEDICINE

## 2025-07-18 PROCEDURE — 90677 PCV20 VACCINE IM: CPT | Performed by: FAMILY MEDICINE

## 2025-07-18 PROCEDURE — 90471 IMMUNIZATION ADMIN: CPT | Performed by: FAMILY MEDICINE

## 2025-07-18 RX ORDER — TRETINOIN 0.1 %
CREAM (GRAM) TOPICAL
COMMUNITY
Start: 2025-07-12

## 2025-07-18 RX ORDER — UREA 40 G/100G
LOTION TOPICAL
COMMUNITY
Start: 2025-07-09

## 2025-07-18 NOTE — RESULT ENCOUNTER NOTE
Labs overall stable, white count slightly low which is typically of little clinical significance.  Possible recent viral syndrome?  If would like to error on side of caution, can repeat CBC nonfasting in 3 weeks.  Notify if any issues, follow-up if symptoms or concerns

## 2025-07-18 NOTE — PROGRESS NOTES
Luly Lopez : 1970 Sex: female  Age: 54 y.o.    Chief Complaint   Patient presents with    Annual Exam    Health Maintenance       HPI:     Presents today with her , for annual physical, overall feeling very well.  Is planning blood work after the visit.  Would like Prevnar 20.  No complaints at this time.  Review of Systems  ROS:  Const: Denies chills, fever, malaise and sweats.  Eyes: Denies discharge, pain, redness and visual disturbance.  ENMT: Denies earaches, other ear symptoms. Denies nasal or sinus symptoms other than stated  above. Denies mouth and tongue lesions and sore throat.  CV: Denies chest discomfort, pain; diaphoresis, dizziness, edema, lightheadedness, orthopnea,  palpitations, syncope and near syncopal episode or any exertional symptoms  Resp: Denies cough, hemoptysis, pleuritic pain, SOB, sputum production and wheezing.  GI: Denies abdominal pain, change in bowel habits, hematochezia, melena, nausea and vomiting.  : Denies urinary symptoms including dysuria , urgency, frequency or hematuria.  Musculo: Denies musculoskeletal symptoms.   Skin: Denies bruising and rash.  Neuro: Denies headache, numbness, stiff neck, tingling and focal weakness slurred speech or facial  droop  Hema/Lymph: Denies bleeding/bruising tendency and enlarged lymph nodes.      Health Maintenance:  Proper diet reviewed including Mediterranean and DASH diets. Counseled on healthy weight, appropriate exercise, avoidance of tobacco, and recommendations for minimal to no alcohol consumption.  Counseled on the potential pros and cons of vitamins and supplements.  Reviewed the recommendations and risk/benefits of vaccines including Moderna x 3, counseled on booster,  Td/Tdap (),  prevnar 20 (agrees) flu vaccine (declines), Hepatitis vaccines (had B series, had A series),  and shingrix (patient had chicken pox)(counseled on shingrix). HIV and Hep C screening guidelines were reviewed.  Importance of

## 2025-08-07 ENCOUNTER — HOSPITAL ENCOUNTER (OUTPATIENT)
Dept: LAB | Age: 55
Discharge: HOME | End: 2025-08-07
Payer: COMMERCIAL

## 2025-08-07 DIAGNOSIS — D72.819 LEUKOPENIA, UNSPECIFIED TYPE: ICD-10-CM

## 2025-08-07 LAB
BASOPHILS # BLD: 0.03 K/UL (ref 0–0.2)
BASOPHILS NFR BLD: 1 % (ref 0–2)
EOSINOPHIL # BLD: 0.24 K/UL (ref 0.05–0.5)
EOSINOPHILS RELATIVE PERCENT: 7 % (ref 0–6)
ERYTHROCYTE [DISTWIDTH] IN BLOOD BY AUTOMATED COUNT: 12.1 % (ref 11.5–15)
HCT VFR BLD AUTO: 40.6 % (ref 34–48)
HGB BLD-MCNC: 13.8 G/DL (ref 11.5–15.5)
IMM GRANULOCYTES # BLD AUTO: <0.03 K/UL (ref 0–0.58)
IMM GRANULOCYTES NFR BLD: 0 % (ref 0–5)
LYMPHOCYTES NFR BLD: 1.21 K/UL (ref 1.5–4)
LYMPHOCYTES RELATIVE PERCENT: 33 % (ref 20–42)
MCH RBC QN AUTO: 32.7 PG (ref 26–35)
MCHC RBC AUTO-ENTMCNC: 34 G/DL (ref 32–34.5)
MCV RBC AUTO: 96.2 FL (ref 80–99.9)
MONOCYTES NFR BLD: 0.3 K/UL (ref 0.1–0.95)
MONOCYTES NFR BLD: 8 % (ref 2–12)
NEUTROPHILS NFR BLD: 51 % (ref 43–80)
NEUTS SEG NFR BLD: 1.87 K/UL (ref 1.8–7.3)
PLATELET # BLD AUTO: 202 K/UL (ref 130–450)
PMV BLD AUTO: 9.5 FL (ref 7–12)
RBC # BLD AUTO: 4.22 M/UL (ref 3.5–5.5)
WBC OTHER # BLD: 3.7 K/UL (ref 4.5–11.5)

## 2025-08-07 PROCEDURE — 85025 COMPLETE CBC W/AUTO DIFF WBC: CPT

## 2025-08-07 PROCEDURE — 36415 COLL VENOUS BLD VENIPUNCTURE: CPT

## 2025-08-09 ENCOUNTER — PATIENT MESSAGE (OUTPATIENT)
Dept: PRIMARY CARE CLINIC | Age: 55
End: 2025-08-09

## 2025-08-22 ENCOUNTER — TELEMEDICINE (OUTPATIENT)
Dept: PRIMARY CARE CLINIC | Age: 55
End: 2025-08-22
Payer: COMMERCIAL

## 2025-08-22 DIAGNOSIS — B96.89 ACUTE BACTERIAL SINUSITIS: Primary | ICD-10-CM

## 2025-08-22 DIAGNOSIS — J01.90 ACUTE BACTERIAL SINUSITIS: Primary | ICD-10-CM

## 2025-08-22 DIAGNOSIS — D72.819 LEUKOPENIA, UNSPECIFIED TYPE: ICD-10-CM

## 2025-08-22 PROCEDURE — 99214 OFFICE O/P EST MOD 30 MIN: CPT | Performed by: FAMILY MEDICINE

## (undated) DEVICE — SPONGE GZ W4XL4IN RAYON POLY CVR W/NONWOVEN FAB STRL 2/PK

## (undated) DEVICE — GRADUATE TRIANG MEASURE 1000ML BLK PRNT